# Patient Record
Sex: FEMALE | Race: WHITE | Employment: FULL TIME | URBAN - NONMETROPOLITAN AREA
[De-identification: names, ages, dates, MRNs, and addresses within clinical notes are randomized per-mention and may not be internally consistent; named-entity substitution may affect disease eponyms.]

---

## 2017-07-26 LAB
ALBUMIN SERPL-MCNC: 3.8 G/DL (ref 3.5–5.2)
ALP BLD-CCNC: 99 U/L (ref 35–104)
ALT SERPL-CCNC: 18 U/L (ref 5–33)
ANION GAP SERPL CALCULATED.3IONS-SCNC: 16 MMOL/L (ref 7–19)
AST SERPL-CCNC: 17 U/L (ref 5–32)
BASOPHILS ABSOLUTE: 0 K/UL (ref 0–0.2)
BASOPHILS RELATIVE PERCENT: 0.6 % (ref 0–1)
BILIRUB SERPL-MCNC: <0.2 MG/DL (ref 0.2–1.2)
BUN BLDV-MCNC: 13 MG/DL (ref 6–20)
CALCIUM SERPL-MCNC: 9.5 MG/DL (ref 8.6–10)
CHLORIDE BLD-SCNC: 103 MMOL/L (ref 98–111)
CHOLESTEROL, TOTAL: 165 MG/DL (ref 160–199)
CO2: 22 MMOL/L (ref 22–29)
CREAT SERPL-MCNC: 0.7 MG/DL (ref 0.5–0.9)
EOSINOPHILS ABSOLUTE: 0.1 K/UL (ref 0–0.6)
EOSINOPHILS RELATIVE PERCENT: 1.8 % (ref 0–5)
GFR NON-AFRICAN AMERICAN: >60
GLUCOSE BLD-MCNC: 102 MG/DL (ref 74–109)
HCT VFR BLD CALC: 41.9 % (ref 37–47)
HDLC SERPL-MCNC: 31 MG/DL (ref 65–121)
HEMOGLOBIN: 13.2 G/DL (ref 12–16)
LDL CHOLESTEROL CALCULATED: 93 MG/DL
LYMPHOCYTES ABSOLUTE: 1.5 K/UL (ref 1.1–4.5)
LYMPHOCYTES RELATIVE PERCENT: 22.2 % (ref 20–40)
MCH RBC QN AUTO: 28.6 PG (ref 27–31)
MCHC RBC AUTO-ENTMCNC: 31.5 G/DL (ref 33–37)
MCV RBC AUTO: 90.7 FL (ref 81–99)
MONOCYTES ABSOLUTE: 0.5 K/UL (ref 0–0.9)
MONOCYTES RELATIVE PERCENT: 7.9 % (ref 0–10)
NEUTROPHILS ABSOLUTE: 4.6 K/UL (ref 1.5–7.5)
NEUTROPHILS RELATIVE PERCENT: 66.3 % (ref 50–65)
PDW BLD-RTO: 13.4 % (ref 11.5–14.5)
PLATELET # BLD: 298 K/UL (ref 130–400)
PMV BLD AUTO: 11.3 FL (ref 9.4–12.3)
POTASSIUM SERPL-SCNC: 4.4 MMOL/L (ref 3.5–5)
RBC # BLD: 4.62 M/UL (ref 4.2–5.4)
SODIUM BLD-SCNC: 141 MMOL/L (ref 136–145)
TOTAL PROTEIN: 7.1 G/DL (ref 6.6–8.7)
TRIGL SERPL-MCNC: 204 MG/DL (ref 150–199)
WBC # BLD: 6.9 K/UL (ref 4.8–10.8)

## 2017-08-01 ENCOUNTER — OFFICE VISIT (OUTPATIENT)
Dept: INTERNAL MEDICINE | Age: 42
End: 2017-08-01
Payer: COMMERCIAL

## 2017-08-01 VITALS
DIASTOLIC BLOOD PRESSURE: 80 MMHG | HEIGHT: 66 IN | HEART RATE: 88 BPM | BODY MASS INDEX: 42.59 KG/M2 | WEIGHT: 265 LBS | SYSTOLIC BLOOD PRESSURE: 138 MMHG | OXYGEN SATURATION: 99 %

## 2017-08-01 DIAGNOSIS — M26.609 JOINT DISEASE, TEMPOROMANDIBULAR: ICD-10-CM

## 2017-08-01 DIAGNOSIS — J32.4 CHRONIC PANSINUSITIS: ICD-10-CM

## 2017-08-01 DIAGNOSIS — E78.00 HYPERCHOLESTEREMIA: Primary | ICD-10-CM

## 2017-08-01 DIAGNOSIS — B02.9 HERPES ZOSTER WITHOUT COMPLICATION: ICD-10-CM

## 2017-08-01 DIAGNOSIS — J41.0 SIMPLE CHRONIC BRONCHITIS (HCC): ICD-10-CM

## 2017-08-01 DIAGNOSIS — Z23 NEED FOR PROPHYLACTIC VACCINATION AGAINST DIPHTHERIA-TETANUS-PERTUSSIS (DTP): ICD-10-CM

## 2017-08-01 DIAGNOSIS — Z13.1 ENCOUNTER FOR SCREENING FOR DIABETES MELLITUS: ICD-10-CM

## 2017-08-01 PROCEDURE — 99215 OFFICE O/P EST HI 40 MIN: CPT | Performed by: INTERNAL MEDICINE

## 2017-08-01 PROCEDURE — 90715 TDAP VACCINE 7 YRS/> IM: CPT | Performed by: INTERNAL MEDICINE

## 2017-08-01 PROCEDURE — 90471 IMMUNIZATION ADMIN: CPT | Performed by: INTERNAL MEDICINE

## 2017-08-01 RX ORDER — HYDROCODONE BITARTRATE AND ACETAMINOPHEN 7.5; 325 MG/1; MG/1
1 TABLET ORAL
COMMUNITY
Start: 2017-03-27 | End: 2017-11-17 | Stop reason: ALTCHOICE

## 2017-08-01 RX ORDER — CETIRIZINE HYDROCHLORIDE 10 MG/1
10 TABLET ORAL
COMMUNITY
End: 2017-08-01 | Stop reason: SDUPTHER

## 2017-08-01 RX ORDER — MELOXICAM 7.5 MG/1
7.5 TABLET ORAL 2 TIMES DAILY
Qty: 60 TABLET | Refills: 5 | Status: SHIPPED | OUTPATIENT
Start: 2017-08-01 | End: 2017-11-17 | Stop reason: ALTCHOICE

## 2017-08-01 ASSESSMENT — ENCOUNTER SYMPTOMS
ABDOMINAL PAIN: 0
EYE DISCHARGE: 0
EYE ITCHING: 0
SORE THROAT: 0
WHEEZING: 0
VOMITING: 0
BLOOD IN STOOL: 0
NAUSEA: 0
TROUBLE SWALLOWING: 0
SHORTNESS OF BREATH: 0
BACK PAIN: 0
ABDOMINAL DISTENTION: 0

## 2017-08-01 ASSESSMENT — PATIENT HEALTH QUESTIONNAIRE - PHQ9
SUM OF ALL RESPONSES TO PHQ QUESTIONS 1-9: 0
2. FEELING DOWN, DEPRESSED OR HOPELESS: 0
SUM OF ALL RESPONSES TO PHQ9 QUESTIONS 1 & 2: 0

## 2017-09-23 ENCOUNTER — OFFICE VISIT (OUTPATIENT)
Dept: URGENT CARE | Age: 42
End: 2017-09-23
Payer: COMMERCIAL

## 2017-09-23 VITALS
BODY MASS INDEX: 42.27 KG/M2 | WEIGHT: 263 LBS | TEMPERATURE: 97.6 F | DIASTOLIC BLOOD PRESSURE: 102 MMHG | HEART RATE: 81 BPM | RESPIRATION RATE: 20 BRPM | SYSTOLIC BLOOD PRESSURE: 170 MMHG | HEIGHT: 66 IN | OXYGEN SATURATION: 97 %

## 2017-09-23 DIAGNOSIS — H65.01 RIGHT ACUTE SEROUS OTITIS MEDIA, RECURRENCE NOT SPECIFIED: Primary | ICD-10-CM

## 2017-09-23 DIAGNOSIS — J02.9 SORE THROAT: ICD-10-CM

## 2017-09-23 LAB — S PYO AG THROAT QL: NORMAL

## 2017-09-23 PROCEDURE — 96372 THER/PROPH/DIAG INJ SC/IM: CPT | Performed by: FAMILY MEDICINE

## 2017-09-23 PROCEDURE — 99213 OFFICE O/P EST LOW 20 MIN: CPT | Performed by: FAMILY MEDICINE

## 2017-09-23 PROCEDURE — 87880 STREP A ASSAY W/OPTIC: CPT | Performed by: FAMILY MEDICINE

## 2017-09-23 RX ORDER — DEXAMETHASONE SODIUM PHOSPHATE 10 MG/ML
10 INJECTION INTRAMUSCULAR; INTRAVENOUS ONCE
Status: COMPLETED | OUTPATIENT
Start: 2017-09-23 | End: 2017-09-23

## 2017-09-23 RX ORDER — AZITHROMYCIN 250 MG/1
TABLET, FILM COATED ORAL
Qty: 1 PACKET | Refills: 0 | Status: SHIPPED | OUTPATIENT
Start: 2017-09-23 | End: 2017-10-03

## 2017-09-23 RX ORDER — EPINEPHRINE 0.3 MG/.3ML
INJECTION INTRAMUSCULAR
COMMUNITY
Start: 2017-08-29 | End: 2021-03-08 | Stop reason: SDUPTHER

## 2017-09-23 RX ORDER — AMOXICILLIN AND CLAVULANATE POTASSIUM 875; 125 MG/1; MG/1
TABLET, FILM COATED ORAL
COMMUNITY
Start: 2017-08-29 | End: 2017-11-17 | Stop reason: ALTCHOICE

## 2017-09-23 RX ADMIN — DEXAMETHASONE SODIUM PHOSPHATE 10 MG: 10 INJECTION INTRAMUSCULAR; INTRAVENOUS at 16:48

## 2017-09-24 ASSESSMENT — ENCOUNTER SYMPTOMS: COUGH: 1

## 2017-11-17 ENCOUNTER — OFFICE VISIT (OUTPATIENT)
Dept: INTERNAL MEDICINE | Age: 42
End: 2017-11-17
Payer: COMMERCIAL

## 2017-11-17 VITALS
OXYGEN SATURATION: 96 % | SYSTOLIC BLOOD PRESSURE: 158 MMHG | DIASTOLIC BLOOD PRESSURE: 104 MMHG | HEIGHT: 67 IN | BODY MASS INDEX: 40.81 KG/M2 | HEART RATE: 86 BPM | WEIGHT: 260 LBS

## 2017-11-17 DIAGNOSIS — I10 ESSENTIAL HYPERTENSION: ICD-10-CM

## 2017-11-17 PROCEDURE — 99213 OFFICE O/P EST LOW 20 MIN: CPT | Performed by: INTERNAL MEDICINE

## 2017-11-17 RX ORDER — PREDNISONE 20 MG/1
TABLET ORAL
COMMUNITY
Start: 2017-11-16 | End: 2018-03-13

## 2017-11-17 RX ORDER — LEVOCETIRIZINE DIHYDROCHLORIDE 5 MG/1
5 TABLET, FILM COATED ORAL NIGHTLY
COMMUNITY
Start: 2017-11-16

## 2017-11-17 RX ORDER — FLUTICASONE PROPIONATE 50 MCG
1 SPRAY, SUSPENSION (ML) NASAL PRN
COMMUNITY
Start: 2017-11-16

## 2017-11-17 RX ORDER — QUINAPRIL 10 MG/1
10 TABLET ORAL DAILY
Qty: 90 TABLET | Refills: 3 | Status: SHIPPED | OUTPATIENT
Start: 2017-11-17 | End: 2018-03-17 | Stop reason: SDUPTHER

## 2017-11-17 RX ORDER — MELOXICAM 15 MG/1
TABLET ORAL
COMMUNITY
Start: 2017-11-09 | End: 2018-03-13

## 2017-11-17 ASSESSMENT — ENCOUNTER SYMPTOMS
BLOOD IN STOOL: 0
EYE ITCHING: 0
BACK PAIN: 0
ABDOMINAL DISTENTION: 0
VOMITING: 0
WHEEZING: 0
EYE DISCHARGE: 0
SINUS PRESSURE: 0
NAUSEA: 0
ABDOMINAL PAIN: 0
SHORTNESS OF BREATH: 0
TROUBLE SWALLOWING: 0
SORE THROAT: 0

## 2017-11-17 NOTE — PROGRESS NOTES
[facility] INTERNAL MEDICINE  [address]  [address] [place] [address]  Dept: [phone]  Dept Fax: [phone]: [phone]      Visit Date: 11/17/2017    Adrienne Schmid is a 38 y.o. female who presents today for:  Chief Complaint   Patient presents with    Hypertension     up 3 times at dr Deepika Marques office thinks she was on norvasc 10 in the past         HPI:     Tere's in for problem visit. She is 38years old and for the last 6 weeks or so her blood pressures been elevated. She's been going to see Dr. Iveth Kelly on the last 3 visits there it's been about 180/100. L1 it's because of her headache and she wants to get it down. Pressure today is 158/100. Social History   Substance Use Topics    Smoking status: Never Smoker    Smokeless tobacco: Never Used    Alcohol use No      Current Outpatient Prescriptions   Medication Sig Dispense Refill    fluticasone (FLONASE) 50 MCG/ACT nasal spray       levocetirizine (XYZAL) 5 MG tablet       meloxicam (MOBIC) 15 MG tablet       predniSONE (DELTASONE) 20 MG tablet       EPIPEN 2-SERGIO 0.3 MG/0.3ML SOAJ injection       Omeprazole Magnesium 10 MG PACK Take 20 mg by mouth      PROAIR  (90 Base) MCG/ACT inhaler        No current facility-administered medications for this visit. No Known Allergies      Subjective:      Review of Systems   Constitutional: Negative for activity change, appetite change, fatigue and fever. HENT: Negative for congestion, hearing loss, sinus pressure, sore throat and trouble swallowing. Eyes: Negative for discharge and itching. Respiratory: Negative for shortness of breath and wheezing. Cardiovascular: Negative for chest pain, palpitations and leg swelling. Gastrointestinal: Negative for abdominal distention, abdominal pain, blood in stool, nausea and vomiting. Endocrine: Negative for cold intolerance, heat intolerance and polydipsia.    Genitourinary: Negative She exhibits normal muscle tone. Coordination normal.   Skin: Skin is warm and dry. No rash noted. She is not diaphoretic. No erythema. No pallor. Psychiatric: She has a normal mood and affect. Her behavior is normal. Judgment and thought content normal.        Assessment:     1. Essential hypertension              Plan:      Essential hypertension which is poorly controlled. I'm going to have her increase her exercise and put her on 2 g sodium diet. She has been on blood pressure medicine back in 2008 and she thinks it was amlodipine 10 mg a day. We are going to check with pharmacy but we'll go ahead and send amlodipine 10 mg into them and I will see her back in 2 weeks for recheck on her blood pressure. Return in about 2 weeks (around 12/1/2017) for blood pressure check. Patient given educational materials - see patient instructions. Discussed use, benefit, and side effects of prescribed medications. All patient questions answered. Pt voiced understanding. Reviewed health maintenance. Instructed to continue current medications, diet and exercise. Patient agreed with treatment plan. **This report has been created using voice recognition software. It may contain minor errors which are inherent in voice recognition technology. **    Electronically signed by Francisco Kaye MD on 11/17/2017 at 9:27 AM

## 2017-11-27 ENCOUNTER — HOSPITAL ENCOUNTER (OUTPATIENT)
Dept: NON INVASIVE DIAGNOSTICS | Age: 42
Discharge: HOME | End: 2017-11-27
Payer: COMMERCIAL

## 2017-11-27 ENCOUNTER — OFFICE VISIT (OUTPATIENT)
Dept: INTERNAL MEDICINE | Age: 42
End: 2017-11-27
Payer: COMMERCIAL

## 2017-11-27 VITALS — DIASTOLIC BLOOD PRESSURE: 80 MMHG | SYSTOLIC BLOOD PRESSURE: 130 MMHG | HEART RATE: 64 BPM | OXYGEN SATURATION: 98 %

## 2017-11-27 DIAGNOSIS — M79.661 PAIN OF RIGHT CALF: ICD-10-CM

## 2017-11-27 DIAGNOSIS — I10 ESSENTIAL HYPERTENSION: Primary | ICD-10-CM

## 2017-11-27 PROCEDURE — 93971 EXTREMITY STUDY: CPT

## 2017-11-27 PROCEDURE — 99214 OFFICE O/P EST MOD 30 MIN: CPT | Performed by: INTERNAL MEDICINE

## 2017-11-27 ASSESSMENT — ENCOUNTER SYMPTOMS
EYE DISCHARGE: 0
ABDOMINAL DISTENTION: 0
SORE THROAT: 0
BLOOD IN STOOL: 0
BACK PAIN: 0
SINUS PRESSURE: 0
WHEEZING: 0
TROUBLE SWALLOWING: 0
SHORTNESS OF BREATH: 0
ABDOMINAL PAIN: 0
NAUSEA: 0
EYE ITCHING: 0
VOMITING: 0

## 2017-11-27 NOTE — PROGRESS NOTES
Neurological: She is alert and oriented to person, place, and time. She has normal reflexes. No cranial nerve deficit. She exhibits normal muscle tone. Coordination normal.   Skin: Skin is warm and dry. No rash noted. She is not diaphoretic. No erythema. No pallor. Psychiatric: She has a normal mood and affect. Her behavior is normal. Judgment and thought content normal.        Assessment:     1. Essential hypertension     2. Pain of right calf  [address] - DE Duplex Extrem Venous, Bilat            Plan:     Lamont Hester present for follow-up on her blood pressure. We saw HER-2 weeks ago and she had an elevated pressure. We started her on medication and brought her back today for follow-up. She reports that her blood pressures been doing much better. Is on Accupril 10 mg a day and she's tolerating it without any side effects. Her blood pressure today is 130/80 on any continue the same. She has a new pain in her Loc right calf is been going on for the last 5 days. It's worse when she stands or tries to walk on it. She denies any trauma. There is no bruising or erythema consistent with any kind of a bleed or infection. I'm going to get a noninvasive exercise or lower extremity to rule out clot. She is to continue the blood pressure medicine as ordered. We will call results of her scan when we get it back. Otherwise we'll see her back at her scheduled appointment. Return for Keep scheduled appointment. .     Patient given educational materials - see patient instructions. Discussed use, benefit, and side effects of prescribed medications. All patient questions answered. Pt voiced understanding. Reviewed health maintenance. Instructed to continue current medications, diet and exercise. Patient agreed with treatment plan. **This report has been created using voice recognition software. It may contain minor errors which are inherent in voice recognition technology. **    Electronically signed by Jhonatan Kim

## 2017-11-28 ENCOUNTER — TELEPHONE (OUTPATIENT)
Dept: INTERNAL MEDICINE | Age: 42
End: 2017-11-28

## 2017-11-28 NOTE — TELEPHONE ENCOUNTER
[facility] and [facility] evaluate her leg.  She is already taking NSAIDs and is on prednisone and I would not add any other thing until she is seen and evaluated by them

## 2018-02-13 DIAGNOSIS — M26.609 JOINT DISEASE, TEMPOROMANDIBULAR: ICD-10-CM

## 2018-02-13 RX ORDER — MELOXICAM 15 MG/1
TABLET ORAL
Qty: 30 TABLET | Refills: 5 | Status: SHIPPED | OUTPATIENT
Start: 2018-02-13 | End: 2018-08-14 | Stop reason: SDUPTHER

## 2018-03-13 ENCOUNTER — TELEPHONE (OUTPATIENT)
Dept: INTERNAL MEDICINE | Age: 43
End: 2018-03-13

## 2018-03-13 ENCOUNTER — OFFICE VISIT (OUTPATIENT)
Dept: INTERNAL MEDICINE | Age: 43
End: 2018-03-13
Payer: COMMERCIAL

## 2018-03-13 VITALS
TEMPERATURE: 98.5 F | SYSTOLIC BLOOD PRESSURE: 122 MMHG | OXYGEN SATURATION: 98 % | DIASTOLIC BLOOD PRESSURE: 80 MMHG | HEART RATE: 86 BPM

## 2018-03-13 DIAGNOSIS — J32.4 PANSINUSITIS, UNSPECIFIED CHRONICITY: Primary | ICD-10-CM

## 2018-03-13 DIAGNOSIS — M79.671 FOOT PAIN, RIGHT: ICD-10-CM

## 2018-03-13 PROCEDURE — 99213 OFFICE O/P EST LOW 20 MIN: CPT | Performed by: INTERNAL MEDICINE

## 2018-03-13 RX ORDER — CEFDINIR 300 MG/1
300 CAPSULE ORAL 2 TIMES DAILY
Qty: 14 CAPSULE | Refills: 0 | Status: SHIPPED | OUTPATIENT
Start: 2018-03-13 | End: 2018-03-20

## 2018-03-13 ASSESSMENT — ENCOUNTER SYMPTOMS
SORE THROAT: 0
ABDOMINAL PAIN: 0
ABDOMINAL DISTENTION: 0
BLOOD IN STOOL: 0
SHORTNESS OF BREATH: 0
TROUBLE SWALLOWING: 0
EYE DISCHARGE: 0
SINUS PRESSURE: 1
VOMITING: 0
SINUS PAIN: 1
WHEEZING: 0
EYE ITCHING: 0
BACK PAIN: 0
VOICE CHANGE: 1
NAUSEA: 0

## 2018-03-13 NOTE — PROGRESS NOTES
[facility] INTERNAL MEDICINE  [address]  [address] [place] [address]  Dept: [phone]  Dept Fax: [phone]: [phone]      Visit Date: 3/13/2018    Bereket Pelletier is a 38 y.o. female who presents today for:  Chief Complaint   Patient presents with    Other     poss bite on top of right foot,hoarse and sob,sore throat,hear ache and headache for 3 days         HPI:     And her present with a sinus infection with head congestion sore throat and drainage down the back of her throat and she is coughing up nasty stuff today. She's had no fever she's also complaining of some foot pain in the top of her left right foot which is caused some swelling looks as though she may have some gout. Social History   Substance Use Topics    Smoking status: Never Smoker    Smokeless tobacco: Never Used    Alcohol use No      Current Outpatient Prescriptions   Medication Sig Dispense Refill    cefdinir (OMNICEF) 300 MG capsule Take 1 capsule by mouth 2 times daily for 7 days 14 capsule 0    meloxicam (MOBIC) 15 MG tablet TAKE 1/2 TABLET BY MOUTH 2 TIMES A DAY 30 tablet 5    fluticasone (FLONASE) 50 MCG/ACT nasal spray       levocetirizine (XYZAL) 5 MG tablet       quinapril (ACCUPRIL) 10 MG tablet Take 1 tablet by mouth daily 90 tablet 3    EPIPEN 2-SERGIO 0.3 MG/0.3ML SOAJ injection       Omeprazole Magnesium 10 MG PACK Take 20 mg by mouth      PROAIR  (90 Base) MCG/ACT inhaler        No current facility-administered medications for this visit. No Known Allergies      Subjective:      Review of Systems   Constitutional: Negative for activity change, appetite change, fatigue and fever. HENT: Positive for hearing loss, postnasal drip, sinus pain, sinus pressure and voice change. Negative for congestion, sore throat and trouble swallowing. Eyes: Negative for discharge and itching. Respiratory: Negative for shortness of breath and wheezing. exhibits no edema, tenderness or deformity. Lymphadenopathy:     She has no cervical adenopathy. Neurological: She is alert and oriented to person, place, and time. She has normal reflexes. No cranial nerve deficit. She exhibits normal muscle tone. Coordination normal.   Skin: Skin is warm and dry. No rash noted. She is not diaphoretic. No erythema. No pallor. Psychiatric: She has a normal mood and affect. Her behavior is normal. Judgment and thought content normal.        Assessment:     1. Pansinusitis, unspecified chronicity     2. Foot pain, right  Uric Acid            Plan:     Sinusitis which is recurrent. I'm going to put her on some  Omnicef 300 mg twice a day for 10 days and she is already on Claritin and Flonase and she's been told to keep herself on that and push fluids. Foot pain looks as though she may have gout send her up to the lab for uric acid level and we will call her the results. Otherwise she is to keep her scheduled appointment. Return for Keep scheduled appointment. .     Patient given educational materials - see patient instructions. Discussed use, benefit, and side effects of prescribed medications. All patient questions answered. Pt voiced understanding. Reviewed health maintenance. Instructed to continue current medications, diet and exercise. Patient agreed with treatment plan. **This report has been created using voice recognition software. It may contain minor errors which are inherent in voice recognition technology. **    Electronically signed by Noemi Wei MD on 3/13/2018 at 11:00 AM

## 2018-03-15 ENCOUNTER — TELEPHONE (OUTPATIENT)
Dept: INTERNAL MEDICINE | Age: 43
End: 2018-03-15

## 2018-03-15 RX ORDER — TOBRAMYCIN 3 MG/ML
1 SOLUTION/ DROPS OPHTHALMIC EVERY 6 HOURS
Qty: 1 BOTTLE | Refills: 1 | Status: SHIPPED | OUTPATIENT
Start: 2018-03-15 | End: 2018-03-25

## 2018-03-19 RX ORDER — QUINAPRIL 10 MG/1
10 TABLET ORAL DAILY
Qty: 30 TABLET | Refills: 0 | Status: SHIPPED | OUTPATIENT
Start: 2018-03-19 | End: 2018-07-16 | Stop reason: CLARIF

## 2018-03-19 NOTE — TELEPHONE ENCOUNTER
Ehsan Robbins called requesting a refill of the below medication which has been pended for you:     Requested Prescriptions     Pending Prescriptions Disp Refills    quinapril (ACCUPRIL) 10 MG tablet [Pharmacy Med Name: QUINAPRIL 10 MG TABLET] 30 tablet 0     Sig: Take 1 tablet by mouth daily       Last Appointment Date: 3/13/2018  Next Appointment Date: 8/1/2018    No Known Allergies

## 2018-06-15 RX ORDER — QUINAPRIL 10 MG/1
10 TABLET ORAL DAILY
Qty: 90 TABLET | Refills: 1 | Status: SHIPPED | OUTPATIENT
Start: 2018-06-15 | End: 2018-07-16 | Stop reason: SDUPTHER

## 2018-07-16 RX ORDER — QUINAPRIL 10 MG/1
10 TABLET ORAL DAILY
Qty: 90 TABLET | Refills: 1 | Status: SHIPPED | OUTPATIENT
Start: 2018-07-16 | End: 2018-08-20 | Stop reason: SDUPTHER

## 2018-08-14 DIAGNOSIS — M26.609 JOINT DISEASE, TEMPOROMANDIBULAR: ICD-10-CM

## 2018-08-14 RX ORDER — MELOXICAM 15 MG/1
TABLET ORAL
Qty: 30 TABLET | Refills: 1 | Status: SHIPPED | OUTPATIENT
Start: 2018-08-14 | End: 2018-10-12 | Stop reason: SDUPTHER

## 2018-08-20 ENCOUNTER — OFFICE VISIT (OUTPATIENT)
Dept: INTERNAL MEDICINE | Age: 43
End: 2018-08-20
Payer: COMMERCIAL

## 2018-08-20 VITALS
SYSTOLIC BLOOD PRESSURE: 142 MMHG | DIASTOLIC BLOOD PRESSURE: 98 MMHG | BODY MASS INDEX: 44.2 KG/M2 | HEART RATE: 84 BPM | WEIGHT: 275 LBS | OXYGEN SATURATION: 98 % | HEIGHT: 66 IN

## 2018-08-20 DIAGNOSIS — B37.89 CANDIDIASIS OF BREAST: ICD-10-CM

## 2018-08-20 DIAGNOSIS — M26.609 JOINT DISEASE, TEMPOROMANDIBULAR: ICD-10-CM

## 2018-08-20 DIAGNOSIS — I10 ESSENTIAL HYPERTENSION: Primary | ICD-10-CM

## 2018-08-20 DIAGNOSIS — E66.9 OBESITY (BMI 35.0-39.9 WITHOUT COMORBIDITY): ICD-10-CM

## 2018-08-20 PROCEDURE — 99215 OFFICE O/P EST HI 40 MIN: CPT | Performed by: INTERNAL MEDICINE

## 2018-08-20 RX ORDER — OMEPRAZOLE 20 MG/1
20 CAPSULE, DELAYED RELEASE ORAL DAILY
COMMUNITY

## 2018-08-20 RX ORDER — CLOTRIMAZOLE AND BETAMETHASONE DIPROPIONATE 10; .64 MG/G; MG/G
CREAM TOPICAL
Qty: 1 TUBE | Refills: 2 | Status: SHIPPED
Start: 2018-08-20 | End: 2020-03-03 | Stop reason: CLARIF

## 2018-08-20 RX ORDER — QUINAPRIL 20 MG/1
20 TABLET ORAL DAILY
Qty: 30 TABLET | Refills: 5 | Status: SHIPPED | OUTPATIENT
Start: 2018-08-20 | End: 2018-09-13 | Stop reason: SDUPTHER

## 2018-08-20 ASSESSMENT — PATIENT HEALTH QUESTIONNAIRE - PHQ9
2. FEELING DOWN, DEPRESSED OR HOPELESS: 0
SUM OF ALL RESPONSES TO PHQ QUESTIONS 1-9: 0
1. LITTLE INTEREST OR PLEASURE IN DOING THINGS: 0
SUM OF ALL RESPONSES TO PHQ9 QUESTIONS 1 & 2: 0
SUM OF ALL RESPONSES TO PHQ QUESTIONS 1-9: 0

## 2018-08-20 ASSESSMENT — ENCOUNTER SYMPTOMS
ABDOMINAL PAIN: 0
TROUBLE SWALLOWING: 0
ROS SKIN COMMENTS: UNDER RIGHT BREAST
EYE ITCHING: 0
EYE DISCHARGE: 0
NAUSEA: 0
WHEEZING: 0
SINUS PRESSURE: 0
BACK PAIN: 0
VOMITING: 0
SORE THROAT: 0
BLOOD IN STOOL: 0
SHORTNESS OF BREATH: 0
ABDOMINAL DISTENTION: 0

## 2018-08-20 NOTE — PROGRESS NOTES
[facility]  [address] [facility]  [address] [place] [id number]  Dept: [phone]  Dept Fax: [phone]: [phone]      Visit Date: 8/20/2018    Mita Grant is a 37 y.o. female who presents today for:  Chief Complaint   Patient presents with    Abdominal Pain     Dr Rolanda Cabrera  gyn    Other     rash under breast         HPI:     Pawel Prakash is 37years old and in for her annual exam. She has problems with obesity and hypertension and her blood pressures not his good visit spent. Her weights up and she needs to get on a late weight loss diet and she's here today talking about that and wants to know what she can do to lose weight. She seems to be highly motivated something she could be successful. She's got a rash under her right breast is driving her crazy. She has TMJ syndrome that she uses meloxicam for her good results. She otherwise has no new complaints other than she wants to lose weight    Past Medical History:   Diagnosis Date    Allergic rhinitis     Asthma     Chronic pansinusitis 8/1/2017    Essential hypertension 11/17/2017    Herpes zoster without complication 2/8/7125    Simple chronic bronchitis ([address] 8/1/2017      Past Surgical History:   Procedure Laterality Date    HYSTERECTOMY      TONSILLECTOMY         No family history on file. Social History   Substance Use Topics    Smoking status: Never Smoker    Smokeless tobacco: Never Used    Alcohol use No      Current Outpatient Prescriptions   Medication Sig Dispense Refill    omeprazole (PRILOSEC) 20 MG delayed release capsule Take 20 mg by mouth daily      quinapril (ACCUPRIL) 20 MG tablet Take 1 tablet by mouth daily 30 tablet 5    clotrimazole-betamethasone (LOTRISONE) 1-0.05 % cream Apply topically 2 times daily.  1 Tube 2    meloxicam (MOBIC) 15 MG tablet TAKE 1/2 TABLET BY MOUTH 2 TIMES A DAY 30 tablet 1    fluticasone (FLONASE) 50 MCG/ACT nasal spray       4:08 PM

## 2018-09-13 ENCOUNTER — TELEPHONE (OUTPATIENT)
Dept: INTERNAL MEDICINE | Age: 43
End: 2018-09-13

## 2018-09-13 RX ORDER — QUINAPRIL 40 MG/1
40 TABLET ORAL DAILY
Qty: 30 TABLET | Refills: 2 | Status: SHIPPED | OUTPATIENT
Start: 2018-09-13 | End: 2018-12-10 | Stop reason: SDUPTHER

## 2018-09-13 NOTE — TELEPHONE ENCOUNTER
Calling back with B/P reading 9/8//92, 9/10//94, 9/12//96, you increased Quinapril to 20mg on 8/20, does she need to increase again or add something to it?

## 2018-10-12 DIAGNOSIS — M26.609 JOINT DISEASE, TEMPOROMANDIBULAR: ICD-10-CM

## 2018-10-12 RX ORDER — MELOXICAM 15 MG/1
TABLET ORAL
Qty: 30 TABLET | Refills: 2 | Status: SHIPPED | OUTPATIENT
Start: 2018-10-12 | End: 2019-01-10 | Stop reason: SDUPTHER

## 2018-10-16 ENCOUNTER — OFFICE VISIT (OUTPATIENT)
Dept: INTERNAL MEDICINE | Age: 43
End: 2018-10-16
Payer: COMMERCIAL

## 2018-10-16 VITALS
SYSTOLIC BLOOD PRESSURE: 130 MMHG | DIASTOLIC BLOOD PRESSURE: 80 MMHG | WEIGHT: 270 LBS | HEART RATE: 80 BPM | OXYGEN SATURATION: 98 % | BODY MASS INDEX: 43.39 KG/M2 | HEIGHT: 66 IN

## 2018-10-16 DIAGNOSIS — M19.90 OSTEOARTHRITIS, UNSPECIFIED OSTEOARTHRITIS TYPE, UNSPECIFIED SITE: ICD-10-CM

## 2018-10-16 DIAGNOSIS — K21.9 GASTROESOPHAGEAL REFLUX DISEASE WITHOUT ESOPHAGITIS: ICD-10-CM

## 2018-10-16 DIAGNOSIS — Z23 FLU VACCINE NEED: ICD-10-CM

## 2018-10-16 DIAGNOSIS — J32.4 CHRONIC PANSINUSITIS: ICD-10-CM

## 2018-10-16 DIAGNOSIS — I10 ESSENTIAL HYPERTENSION: Primary | ICD-10-CM

## 2018-10-16 PROCEDURE — 99214 OFFICE O/P EST MOD 30 MIN: CPT | Performed by: INTERNAL MEDICINE

## 2018-10-16 PROCEDURE — 90686 IIV4 VACC NO PRSV 0.5 ML IM: CPT | Performed by: INTERNAL MEDICINE

## 2018-10-16 PROCEDURE — 90471 IMMUNIZATION ADMIN: CPT | Performed by: INTERNAL MEDICINE

## 2018-10-16 ASSESSMENT — ENCOUNTER SYMPTOMS
SINUS PRESSURE: 0
TROUBLE SWALLOWING: 0
WHEEZING: 0
BLOOD IN STOOL: 0
VOMITING: 0
ABDOMINAL PAIN: 0
BACK PAIN: 0
ABDOMINAL DISTENTION: 0
NAUSEA: 0
EYE DISCHARGE: 0
SHORTNESS OF BREATH: 0
SORE THROAT: 0
EYE ITCHING: 0

## 2018-10-16 NOTE — ADDENDUM NOTE
[facility] on: 10/16/2018 03:06 PM     Modules accepted: Orders

## 2018-10-16 NOTE — PROGRESS NOTES
Systems   Constitutional: Negative for activity change, appetite change, fatigue and fever. HENT: Negative for congestion, hearing loss, sinus pressure, sore throat and trouble swallowing. Eyes: Negative for discharge and itching. Respiratory: Negative for shortness of breath and wheezing. Cardiovascular: Negative for chest pain, palpitations and leg swelling. Gastrointestinal: Negative for abdominal distention, abdominal pain, blood in stool, nausea and vomiting. Endocrine: Negative for cold intolerance, heat intolerance and polydipsia. Genitourinary: Negative for flank pain, frequency, hematuria and urgency. Musculoskeletal: Negative for arthralgias, back pain and joint swelling. Skin: Negative for rash and wound. Allergic/Immunologic: Negative for environmental allergies and food allergies. Neurological: Negative for dizziness, tremors, syncope, weakness, numbness and headaches. Hematological: Negative for adenopathy. Psychiatric/Behavioral: Negative for agitation and hallucinations. The patient is not nervous/anxious. Objective:     /80   Pulse 80   Ht 5' 6\" (1.676 m)   Wt 270 lb (122.5 kg)   SpO2 98%   BMI 43.58 kg/m²    Physical Exam   Constitutional: She is oriented to person, place, and time. She appears well-developed and well-nourished. No distress. HENT:   Head: Normocephalic and atraumatic. Right Ear: External ear normal.   Left Ear: External ear normal.   Nose: Nose normal.   Mouth/Throat: Oropharynx is clear and moist. No oropharyngeal exudate. Eyes: Pupils are equal, round, and reactive to light. Conjunctivae and EOM are normal. Right eye exhibits no discharge. Left eye exhibits no discharge. No scleral icterus. Neck: Normal range of motion. Neck supple. No JVD present. No tracheal deviation present. No thyromegaly present. Cardiovascular: Normal rate, regular rhythm, normal heart sounds and intact distal pulses.   Exam reveals no gallop and no

## 2018-12-10 RX ORDER — QUINAPRIL 40 MG/1
TABLET ORAL
Qty: 30 TABLET | Refills: 0 | Status: SHIPPED | OUTPATIENT
Start: 2018-12-10 | End: 2019-01-10 | Stop reason: SDUPTHER

## 2018-12-10 NOTE — TELEPHONE ENCOUNTER
Zak Blair called requesting a refill of the below medication which has been pended for you:     Requested Prescriptions     Pending Prescriptions Disp Refills    quinapril (ACCUPRIL) 40 MG tablet [Pharmacy Med Name: QUINAPRIL 40 MG TABLET] 30 tablet 0     Sig: TAKE ONE TABLET BY MOUTH EVERY DAY       Last Appointment Date: 10/16/2018  Next Appointment Date: Visit date not found    No Known Allergies

## 2019-01-10 DIAGNOSIS — M26.609 JOINT DISEASE, TEMPOROMANDIBULAR: ICD-10-CM

## 2019-01-10 RX ORDER — MELOXICAM 15 MG/1
TABLET ORAL
Qty: 30 TABLET | Refills: 0 | Status: SHIPPED | OUTPATIENT
Start: 2019-01-10 | End: 2019-03-13 | Stop reason: SDUPTHER

## 2019-01-10 RX ORDER — QUINAPRIL 40 MG/1
TABLET ORAL
Qty: 30 TABLET | Refills: 0 | Status: SHIPPED | OUTPATIENT
Start: 2019-01-10 | End: 2019-03-13 | Stop reason: SDUPTHER

## 2019-03-02 ENCOUNTER — OFFICE VISIT (OUTPATIENT)
Dept: URGENT CARE | Age: 44
End: 2019-03-02
Payer: COMMERCIAL

## 2019-03-02 VITALS
TEMPERATURE: 98 F | HEART RATE: 101 BPM | SYSTOLIC BLOOD PRESSURE: 168 MMHG | BODY MASS INDEX: 44.55 KG/M2 | WEIGHT: 276 LBS | OXYGEN SATURATION: 98 % | DIASTOLIC BLOOD PRESSURE: 102 MMHG | RESPIRATION RATE: 18 BRPM

## 2019-03-02 DIAGNOSIS — B96.89 ACUTE BACTERIAL SINUSITIS: ICD-10-CM

## 2019-03-02 DIAGNOSIS — J06.9 UPPER RESPIRATORY TRACT INFECTION, UNSPECIFIED TYPE: Primary | ICD-10-CM

## 2019-03-02 DIAGNOSIS — H66.001 ACUTE SUPPURATIVE OTITIS MEDIA OF RIGHT EAR WITHOUT SPONTANEOUS RUPTURE OF TYMPANIC MEMBRANE, RECURRENCE NOT SPECIFIED: ICD-10-CM

## 2019-03-02 DIAGNOSIS — J01.90 ACUTE BACTERIAL SINUSITIS: ICD-10-CM

## 2019-03-02 LAB
INFLUENZA A ANTIBODY: NEGATIVE
INFLUENZA B ANTIBODY: NEGATIVE

## 2019-03-02 PROCEDURE — 87804 INFLUENZA ASSAY W/OPTIC: CPT | Performed by: FAMILY MEDICINE

## 2019-03-02 PROCEDURE — 99213 OFFICE O/P EST LOW 20 MIN: CPT | Performed by: FAMILY MEDICINE

## 2019-03-02 RX ORDER — LEVOFLOXACIN 500 MG/1
500 TABLET, FILM COATED ORAL DAILY
Qty: 10 TABLET | Refills: 0 | Status: SHIPPED | OUTPATIENT
Start: 2019-03-02 | End: 2019-03-12

## 2019-03-02 ASSESSMENT — ENCOUNTER SYMPTOMS
SORE THROAT: 1
COUGH: 0
SINUS PRESSURE: 1
SINUS COMPLAINT: 1
SWOLLEN GLANDS: 0

## 2019-03-08 ENCOUNTER — OFFICE VISIT (OUTPATIENT)
Dept: INTERNAL MEDICINE | Age: 44
End: 2019-03-08
Payer: COMMERCIAL

## 2019-03-08 VITALS — SYSTOLIC BLOOD PRESSURE: 140 MMHG | HEART RATE: 84 BPM | OXYGEN SATURATION: 98 % | DIASTOLIC BLOOD PRESSURE: 80 MMHG

## 2019-03-08 DIAGNOSIS — I10 ESSENTIAL HYPERTENSION: Primary | ICD-10-CM

## 2019-03-08 PROCEDURE — 99213 OFFICE O/P EST LOW 20 MIN: CPT | Performed by: INTERNAL MEDICINE

## 2019-03-08 ASSESSMENT — ENCOUNTER SYMPTOMS
EYE ITCHING: 0
VOMITING: 0
ABDOMINAL DISTENTION: 0
SORE THROAT: 0
NAUSEA: 0
ABDOMINAL PAIN: 0
BLOOD IN STOOL: 0
SINUS PRESSURE: 0
EYE DISCHARGE: 0
WHEEZING: 0
SHORTNESS OF BREATH: 0
TROUBLE SWALLOWING: 0
BACK PAIN: 0

## 2019-03-08 ASSESSMENT — PATIENT HEALTH QUESTIONNAIRE - PHQ9
SUM OF ALL RESPONSES TO PHQ QUESTIONS 1-9: 0
1. LITTLE INTEREST OR PLEASURE IN DOING THINGS: 0
2. FEELING DOWN, DEPRESSED OR HOPELESS: 0
SUM OF ALL RESPONSES TO PHQ QUESTIONS 1-9: 0
SUM OF ALL RESPONSES TO PHQ9 QUESTIONS 1 & 2: 0

## 2019-03-22 ENCOUNTER — TELEPHONE (OUTPATIENT)
Dept: INTERNAL MEDICINE | Age: 44
End: 2019-03-22

## 2019-03-22 RX ORDER — QUINAPRIL 40 MG/1
40 TABLET ORAL 2 TIMES DAILY
Qty: 180 TABLET | Refills: 2 | Status: SHIPPED | OUTPATIENT
Start: 2019-03-22 | End: 2019-04-01

## 2019-04-01 ENCOUNTER — TELEPHONE (OUTPATIENT)
Dept: INTERNAL MEDICINE | Age: 44
End: 2019-04-01

## 2019-04-01 RX ORDER — LOSARTAN POTASSIUM AND HYDROCHLOROTHIAZIDE 25; 100 MG/1; MG/1
1 TABLET ORAL DAILY
Qty: 30 TABLET | Refills: 2 | Status: SHIPPED | OUTPATIENT
Start: 2019-04-01 | End: 2019-06-29 | Stop reason: SDUPTHER

## 2019-06-04 DIAGNOSIS — M26.609 JOINT DISEASE, TEMPOROMANDIBULAR: ICD-10-CM

## 2019-06-04 RX ORDER — MELOXICAM 15 MG/1
TABLET ORAL
Qty: 15 TABLET | Refills: 0 | Status: SHIPPED | OUTPATIENT
Start: 2019-06-04 | End: 2019-07-23 | Stop reason: SDUPTHER

## 2019-06-04 NOTE — TELEPHONE ENCOUNTER
[facility] called requesting a refill of the below medication which has been pended for you:     Requested Prescriptions     Pending Prescriptions Disp Refills    meloxicam (MOBIC) 15 MG tablet [Pharmacy Med Name: MELOXICAM 15 MG TABLET *CAMERON*] 15 tablet 0     Sig: TAKE 1/2 TABLET BY MOUTH 2 TIMES A DAY       Last Appointment Date: 3/8/2019  Next Appointment Date: 8/27/2019    No Known Allergies

## 2019-06-26 ENCOUNTER — OFFICE VISIT (OUTPATIENT)
Dept: OTOLARYNGOLOGY | Age: 44
End: 2019-06-26
Payer: COMMERCIAL

## 2019-06-26 VITALS
DIASTOLIC BLOOD PRESSURE: 80 MMHG | HEART RATE: 101 BPM | RESPIRATION RATE: 18 BRPM | SYSTOLIC BLOOD PRESSURE: 144 MMHG | WEIGHT: 286.4 LBS | BODY MASS INDEX: 46.23 KG/M2 | TEMPERATURE: 98.9 F

## 2019-06-26 DIAGNOSIS — M26.69 TMJ CAPSULITIS: Primary | ICD-10-CM

## 2019-06-26 DIAGNOSIS — H92.01 OTALGIA OF RIGHT EAR: ICD-10-CM

## 2019-06-26 DIAGNOSIS — F45.8 BRUXISM: ICD-10-CM

## 2019-06-26 DIAGNOSIS — H61.21 IMPACTED CERUMEN OF RIGHT EAR: ICD-10-CM

## 2019-06-26 PROCEDURE — 99203 OFFICE O/P NEW LOW 30 MIN: CPT | Performed by: OTOLARYNGOLOGY

## 2019-06-26 NOTE — PROGRESS NOTES
[facility]  [address] [facility] [address]  Dept: [phone]  Dept Fax: [phone]  Loc: [phone]     Yu Daugherty is a 37 y.o. female who presents today for her medical conditions/complaintsas noted below. Yu Daugherty is c/o of New Patient (referral from - for ear pain)      Past Medical History:   Diagnosis Date    Allergic rhinitis     Asthma     Chronic pansinusitis 8/1/2017    Essential hypertension 11/17/2017    Gastroesophageal reflux disease without esophagitis 10/16/2018    Herpes zoster without complication 7/9/9271    Osteoarthritis 10/16/2018    Simple chronic bronchitis ([address] 8/1/2017      Past Surgical History:   Procedure Laterality Date    HYSTERECTOMY      TONSILLECTOMY         History reviewed. No pertinent family history. Social History     Tobacco Use    Smoking status: Never Smoker    Smokeless tobacco: Never Used   Substance Use Topics    Alcohol use: No         Current Outpatient Medications   Medication Sig Dispense Refill    meloxicam (MOBIC) 15 MG tablet TAKE 1/2 TABLET BY MOUTH 2 TIMES A DAY 15 tablet 0    losartan-hydrochlorothiazide (HYZAAR) 100-25 MG per tablet Take 1 tablet by mouth daily 30 tablet 2    omeprazole (PRILOSEC) 20 MG delayed release capsule Take 20 mg by mouth daily      clotrimazole-betamethasone (LOTRISONE) 1-0.05 % cream Apply topically 2 times daily. 1 Tube 2    fluticasone (FLONASE) 50 MCG/ACT nasal spray       levocetirizine (XYZAL) 5 MG tablet       EPIPEN 2-SERGIO 0.3 MG/0.3ML SOAJ injection       PROAIR  (90 Base) MCG/ACT inhaler        No current facility-administered medications for this visit. No Known Allergies    Subjective:   Right ear pain since March. Bruxism- and associated ear pain / discomfort. The chief complaint centers around the ear area, temporomandibular joint capsule, upper neck region, and stylohyoid ligament.  The pain is intermittent but sometimes can be more constant. The pain may fluctuate in intensity and sometimes be more severe. This pain has been present for 3 months. The patient points to the region of the ear canal and joint capsule and the infra-auricular area when describing the focal point of the pain. There has been no appreciable progression of these symptoms since they began. Aware of clenching and/or grinding. Review of Systems  A 12 point review of systems was completed, reviewed, and scanned to chart per staff. Patient medical history reviewed. Objective:      Physical Exam  BP (!) 144/80 Comment: left  Pulse 101   Temp 98.9 °F (37.2 °C)   Resp 18   Wt 286 lb 6.4 oz (129.9 kg)   BMI 46.23 kg/m²   Physical Exam:     General appearance: Normally developed structures of the head and neck with no deformities. Ability to communicate: Normal voice with ability to convey appropriately the nature of their complaints. Head and Face: Normal appearance with no visible lesions of the skin. Sinus tenderness: None appreciated on exam. No areas of facial swelling. Facial strength: No weakness of the facial muscles appreciated. Otoscopic: Normal external ear canals and tympanic membranes. Hearing assessment: normal to soft voice and finger rub. External ears and nose: normal.   Nasal exam: Anterior speculum exam normal with no polyps purulence or lesions. Oral:  Mucosa of buccal, floor of mouth, and palatal areas appear normal and free of any lesions. Lips, teeth, gingiva: Normal with no lesions. Oropharynx: Tongue reveals normal appearance and mobility. Oral mucosa of buccal, floor of mouth, and palatal areas appear normal and free of any lesions or ulcerations. Salivary:  Examination of the parotid and submandibular glands was normal with no palpable masses, nodules or irregularities. Neck: No gross swellings or deformities. No palpable lymphadenopathy. Thyroid normal without palpable masses. Respiratory:  Effort appears normal with no notable distress, stridor,accessory muscle usage or tachypnea     The patient has impacted cerumen. Impacted cerumen was safely removed from the right ear with appropriate instrumentation viewing through an operating microscope. It was necessary to use the microscope. Cerumen was removed as well as other debris uneventfully with special instruments including a day hook, curette, and ear suction. Fiberoptic exam- wnl  Due to the nature of the patient's complaints and/or differential diagnosis, a fiberoptic exam to evaluate the nasopharynx, hypopharynx and larynx is warranted. Anesthesia of topical lidocaine and kale-synephrine was administered intranasally and a fiberoptic scope was introduced transnasnasally to evaluate the anatomy. The nasopharynx, vallecula, tongue base, and pharyngeal mucosa were visualized as well. The patient was asked to phonate and perform maneuvers to facilitate the exam.    The above exam was completely normal and free of any lesions or mucosal abnormalities. Assessment:       Diagnosis Orders   1. TMJ capsulitis     2. Otalgia of right ear     3. Bruxism     4. Impacted cerumen of right ear           Plan:        Impression / Plan:   Head and Neck discomfort associated with this activity that can include ear pain and pain in area musculature, especially the muscles of mastication and upper neck near skull base. No evidence of any significant disease process or lesions in a patient with pain involving the jaw joint and adjacent capsule. Return prn should any other symptoms arise or worsen. Condition explained and options include a , relaxation therapy, nad Oral Surgery consultation for TMJ. Note:  A total of > 50%  (of the total minutes) of this 30 minute visit was spent discussing the pathophysiology and treatment options and/or coordination of care of the above dx.       shiloh Ferraro for and in the presence of Dr. Ray Mendez June 26, 2019/3:28 PM/Latoya Mendez. Ron Bianchi MD,  I personally performed the services described in this documentation as scribed by Bebeto Quinteros in my presence and it appears accurate and complete.

## 2019-07-01 RX ORDER — LOSARTAN POTASSIUM AND HYDROCHLOROTHIAZIDE 25; 100 MG/1; MG/1
TABLET ORAL
Qty: 30 TABLET | Refills: 0 | Status: SHIPPED | OUTPATIENT
Start: 2019-07-01 | End: 2019-08-19 | Stop reason: SDUPTHER

## 2019-07-23 DIAGNOSIS — M26.609 JOINT DISEASE, TEMPOROMANDIBULAR: ICD-10-CM

## 2019-07-23 RX ORDER — MELOXICAM 15 MG/1
TABLET ORAL
Qty: 30 TABLET | Refills: 5 | Status: SHIPPED | OUTPATIENT
Start: 2019-07-23 | End: 2020-01-21

## 2019-08-19 RX ORDER — LOSARTAN POTASSIUM AND HYDROCHLOROTHIAZIDE 25; 100 MG/1; MG/1
TABLET ORAL
Qty: 30 TABLET | Refills: 0 | Status: SHIPPED | OUTPATIENT
Start: 2019-08-19 | End: 2019-09-21 | Stop reason: SDUPTHER

## 2019-08-19 NOTE — TELEPHONE ENCOUNTER
Tata Carson called requesting a refill of the below medication which has been pended for you:     Requested Prescriptions     Pending Prescriptions Disp Refills    losartan-hydrochlorothiazide (HYZAAR) 100-25 MG per tablet [Pharmacy Med Name: LOSARTAN-HCTZ 100-25 MG TAB] 30 tablet 0     Sig: TAKE ONE TABLET BY MOUTH EVERY DAY       Last Appointment Date: 3/8/2019  Next Appointment Date: 8/30/2019    No Known Allergies

## 2019-08-30 ENCOUNTER — OFFICE VISIT (OUTPATIENT)
Dept: INTERNAL MEDICINE | Age: 44
End: 2019-08-30
Payer: COMMERCIAL

## 2019-08-30 VITALS
WEIGHT: 285 LBS | SYSTOLIC BLOOD PRESSURE: 132 MMHG | OXYGEN SATURATION: 99 % | HEIGHT: 66 IN | BODY MASS INDEX: 45.8 KG/M2 | HEART RATE: 94 BPM | DIASTOLIC BLOOD PRESSURE: 70 MMHG

## 2019-08-30 DIAGNOSIS — E78.2 MIXED HYPERLIPIDEMIA: ICD-10-CM

## 2019-08-30 DIAGNOSIS — I10 ESSENTIAL HYPERTENSION: Primary | ICD-10-CM

## 2019-08-30 DIAGNOSIS — K21.9 GASTROESOPHAGEAL REFLUX DISEASE WITHOUT ESOPHAGITIS: ICD-10-CM

## 2019-08-30 DIAGNOSIS — M19.90 OSTEOARTHRITIS, UNSPECIFIED OSTEOARTHRITIS TYPE, UNSPECIFIED SITE: ICD-10-CM

## 2019-08-30 PROCEDURE — 99214 OFFICE O/P EST MOD 30 MIN: CPT | Performed by: INTERNAL MEDICINE

## 2019-08-30 ASSESSMENT — ENCOUNTER SYMPTOMS
ABDOMINAL DISTENTION: 0
EYE DISCHARGE: 0
EYE ITCHING: 0
BACK PAIN: 0
SORE THROAT: 0
TROUBLE SWALLOWING: 0
BLOOD IN STOOL: 0
WHEEZING: 0
ABDOMINAL PAIN: 0
SHORTNESS OF BREATH: 0
SINUS PRESSURE: 0
VOMITING: 0
NAUSEA: 0

## 2019-09-23 RX ORDER — LOSARTAN POTASSIUM AND HYDROCHLOROTHIAZIDE 12.5; 5 MG/1; MG/1
TABLET ORAL
Qty: 60 TABLET | Refills: 0 | Status: SHIPPED | OUTPATIENT
Start: 2019-09-23 | End: 2019-10-22 | Stop reason: SDUPTHER

## 2019-10-22 RX ORDER — LOSARTAN POTASSIUM AND HYDROCHLOROTHIAZIDE 12.5; 5 MG/1; MG/1
TABLET ORAL
Qty: 60 TABLET | Refills: 0 | Status: SHIPPED | OUTPATIENT
Start: 2019-10-22 | End: 2019-11-20 | Stop reason: SDUPTHER

## 2019-11-20 RX ORDER — LOSARTAN POTASSIUM AND HYDROCHLOROTHIAZIDE 12.5; 5 MG/1; MG/1
TABLET ORAL
Qty: 60 TABLET | Refills: 3 | Status: SHIPPED
Start: 2019-11-20 | End: 2020-09-08 | Stop reason: CLARIF

## 2020-01-21 RX ORDER — MELOXICAM 15 MG/1
TABLET ORAL
Qty: 30 TABLET | Refills: 0 | Status: SHIPPED | OUTPATIENT
Start: 2020-01-21 | End: 2020-02-18

## 2020-02-18 RX ORDER — MELOXICAM 15 MG/1
TABLET ORAL
Qty: 30 TABLET | Refills: 1 | Status: SHIPPED | OUTPATIENT
Start: 2020-02-18 | End: 2020-04-09

## 2020-02-18 NOTE — TELEPHONE ENCOUNTER
Catalina  called requesting a refill of the below medication which has been pended for you:     Requested Prescriptions     Pending Prescriptions Disp Refills    meloxicam (MOBIC) 15 MG tablet [Pharmacy Med Name: MELOXICAM 15 MG TABLET *CAMERON*] 30 tablet 1     Sig: TAKE 1/2 TABLET BY MOUTH 2 TIMES A DAY       Last Appointment Date: 8/30/2019  Next Appointment Date: 3/3/2020    No Known Allergies

## 2020-03-03 ENCOUNTER — OFFICE VISIT (OUTPATIENT)
Dept: INTERNAL MEDICINE | Age: 45
End: 2020-03-03
Payer: COMMERCIAL

## 2020-03-03 VITALS
SYSTOLIC BLOOD PRESSURE: 126 MMHG | OXYGEN SATURATION: 98 % | HEART RATE: 81 BPM | WEIGHT: 293 LBS | BODY MASS INDEX: 47.29 KG/M2 | DIASTOLIC BLOOD PRESSURE: 80 MMHG

## 2020-03-03 PROCEDURE — 99214 OFFICE O/P EST MOD 30 MIN: CPT | Performed by: INTERNAL MEDICINE

## 2020-03-03 ASSESSMENT — ENCOUNTER SYMPTOMS
TROUBLE SWALLOWING: 0
VOMITING: 0
SORE THROAT: 0
ABDOMINAL DISTENTION: 0
NAUSEA: 0
EYE DISCHARGE: 0
SINUS PRESSURE: 0
BACK PAIN: 0
BLOOD IN STOOL: 0
ABDOMINAL PAIN: 0
WHEEZING: 0
EYE ITCHING: 0
SHORTNESS OF BREATH: 0

## 2020-03-03 ASSESSMENT — PATIENT HEALTH QUESTIONNAIRE - PHQ9
2. FEELING DOWN, DEPRESSED OR HOPELESS: 0
SUM OF ALL RESPONSES TO PHQ QUESTIONS 1-9: 0
SUM OF ALL RESPONSES TO PHQ QUESTIONS 1-9: 0
SUM OF ALL RESPONSES TO PHQ9 QUESTIONS 1 & 2: 0
1. LITTLE INTEREST OR PLEASURE IN DOING THINGS: 0

## 2020-03-03 NOTE — PROGRESS NOTES
Negative for activity change, appetite change, fatigue and fever. HENT: Negative for congestion, hearing loss, sinus pressure, sore throat and trouble swallowing. Eyes: Negative for discharge and itching. Respiratory: Negative for shortness of breath and wheezing. Cardiovascular: Negative for chest pain, palpitations and leg swelling. Gastrointestinal: Negative for abdominal distention, abdominal pain, blood in stool, nausea and vomiting. Endocrine: Negative for cold intolerance, heat intolerance and polydipsia. Genitourinary: Negative for flank pain, frequency, hematuria and urgency. Musculoskeletal: Negative for arthralgias, back pain and joint swelling. Skin: Negative for rash and wound. Allergic/Immunologic: Negative for environmental allergies and food allergies. Neurological: Negative for dizziness, tremors, syncope, weakness, numbness and headaches. Hematological: Negative for adenopathy. Psychiatric/Behavioral: Negative for agitation and hallucinations. The patient is not nervous/anxious. Objective:      /80   Pulse 81   Wt 293 lb (132.9 kg)   SpO2 98%   BMI 47.29 kg/m²    Physical Exam  Constitutional:       General: She is not in acute distress. Appearance: She is well-developed. She is not diaphoretic. HENT:      Head: Normocephalic and atraumatic. Right Ear: External ear normal.      Left Ear: External ear normal.      Nose: Nose normal.      Mouth/Throat:      Pharynx: No oropharyngeal exudate. Eyes:      General: No scleral icterus. Right eye: No discharge. Left eye: No discharge. Conjunctiva/sclera: Conjunctivae normal.      Pupils: Pupils are equal, round, and reactive to light. Neck:      Musculoskeletal: Normal range of motion and neck supple. Thyroid: No thyromegaly. Vascular: No JVD. Trachea: No tracheal deviation. Cardiovascular:      Rate and Rhythm: Normal rate and regular rhythm.       Heart sounds: Normal heart sounds. No murmur. No friction rub. No gallop. Pulmonary:      Effort: Pulmonary effort is normal. No respiratory distress. Breath sounds: Normal breath sounds. No wheezing or rales. Abdominal:      General: Bowel sounds are normal. There is no distension. Palpations: Abdomen is soft. There is no mass. Tenderness: There is no abdominal tenderness. There is no guarding or rebound. Musculoskeletal: Normal range of motion. General: No tenderness or deformity. Lymphadenopathy:      Cervical: No cervical adenopathy. Skin:     General: Skin is warm and dry. Coloration: Skin is not pale. Findings: No erythema or rash. Neurological:      Mental Status: She is alert and oriented to person, place, and time. Cranial Nerves: No cranial nerve deficit. Motor: No abnormal muscle tone. Coordination: Coordination normal.      Deep Tendon Reflexes: Reflexes are normal and symmetric. Reflexes normal.   Psychiatric:         Behavior: Behavior normal.         Thought Content: Thought content normal.         Judgment: Judgment normal.          Assessment:      Diagnosis Orders   1. Essential hypertension  CBC Auto Differential    Comprehensive Metabolic Panel    Lipid Panel   2. Mixed hyperlipidemia  CBC Auto Differential    Comprehensive Metabolic Panel    Lipid Panel   3. Gastroesophageal reflux disease without esophagitis  CBC Auto Differential    Comprehensive Metabolic Panel    Lipid Panel            Plan:     Essential hypertension which is under great control. Blood pressure is 126/80. She is on losartan/HCTZ and she is tolerating it well and is going to continue the same dose. Hyperlipidemia. Lipids are up a little bit. 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 She is going to try to get out and do more and be more active. Gastroesophageal reflux.   She is on omeprazole with good results and will

## 2020-04-09 RX ORDER — LOSARTAN POTASSIUM AND HYDROCHLOROTHIAZIDE 25; 100 MG/1; MG/1
TABLET ORAL
Qty: 30 TABLET | Refills: 11 | Status: SHIPPED | OUTPATIENT
Start: 2020-04-09 | End: 2021-03-08 | Stop reason: SDUPTHER

## 2020-04-09 RX ORDER — MELOXICAM 15 MG/1
TABLET ORAL
Qty: 30 TABLET | Refills: 11 | Status: SHIPPED | OUTPATIENT
Start: 2020-04-09 | End: 2021-03-08 | Stop reason: SDUPTHER

## 2020-09-08 ENCOUNTER — OFFICE VISIT (OUTPATIENT)
Dept: INTERNAL MEDICINE | Age: 45
End: 2020-09-08
Payer: COMMERCIAL

## 2020-09-08 VITALS
BODY MASS INDEX: 46.61 KG/M2 | DIASTOLIC BLOOD PRESSURE: 78 MMHG | SYSTOLIC BLOOD PRESSURE: 122 MMHG | HEIGHT: 66 IN | HEART RATE: 86 BPM | OXYGEN SATURATION: 98 % | WEIGHT: 290 LBS

## 2020-09-08 PROBLEM — R73.09 ELEVATED GLUCOSE: Status: ACTIVE | Noted: 2020-09-08

## 2020-09-08 PROCEDURE — 99214 OFFICE O/P EST MOD 30 MIN: CPT | Performed by: INTERNAL MEDICINE

## 2020-09-08 ASSESSMENT — ENCOUNTER SYMPTOMS
SORE THROAT: 0
ABDOMINAL DISTENTION: 0
EYE DISCHARGE: 0
BACK PAIN: 0
ABDOMINAL PAIN: 0
TROUBLE SWALLOWING: 0
EYE ITCHING: 0
WHEEZING: 0
SINUS PRESSURE: 0
BLOOD IN STOOL: 0
VOMITING: 0
NAUSEA: 0
SHORTNESS OF BREATH: 0

## 2020-09-08 NOTE — PROGRESS NOTES
[address] [facility]  [address] [address] [place] [address]  Dept: [phone]  Dept Fax: [phone] 33: [phone]      Visit Date: 9/8/2020    Kulwinder Ma a 50 y.o. female who presents today for:  Chief Complaint   Patient presents with    Annual Exam         HPI:     50years old in for her annual exam.  She has a history of chronic sinusitis, hypertension gastric reflux and her blood sugar is becoming elevated. Past Medical History:   Diagnosis Date    Allergic rhinitis     Asthma     Chronic pansinusitis 8/1/2017    Essential hypertension 11/17/2017    Gastroesophageal reflux disease without esophagitis 10/16/2018    Herpes zoster without complication 7/0/8285    Mixed hyperlipidemia 8/30/2019    Osteoarthritis 10/16/2018    Simple chronic bronchitis ([address] 8/1/2017      Past Surgical History:   Procedure Laterality Date    HYSTERECTOMY      TONSILLECTOMY         No family history on file. Social History     Tobacco Use    Smoking status: Never Smoker    Smokeless tobacco: Never Used   Substance Use Topics    Alcohol use: No      Current Outpatient Medications   Medication Sig Dispense Refill    losartan-hydrochlorothiazide (HYZAAR) 100-25 MG per tablet TAKE (1) TABLET BY MOUTH EVERY DAY **NEW STRENGTH** 30 tablet 11    meloxicam (MOBIC) 15 MG tablet TAKE 1/2 TABLET BY MOUTH 2 TIMES A DAY 30 tablet 11    omeprazole (PRILOSEC) 20 MG delayed release capsule Take 20 mg by mouth daily      fluticasone (FLONASE) 50 MCG/ACT nasal spray       levocetirizine (XYZAL) 5 MG tablet       EPIPEN 2-SERGIO 0.3 MG/0.3ML SOAJ injection       PROAIR  (90 Base) MCG/ACT inhaler        No current facility-administered medications for this visit. No Known Allergies      Subjective:     Review of Systems   Constitutional: Negative for activity change, appetite change, fatigue and fever.    HENT: Negative for congestion, hearing loss, sinus pressure, sore throat and trouble swallowing. Eyes: Negative for discharge and itching. Respiratory: Negative for shortness of breath and wheezing. Cardiovascular: Negative for chest pain, palpitations and leg swelling. Gastrointestinal: Negative for abdominal distention, abdominal pain, blood in stool, nausea and vomiting. Endocrine: Negative for cold intolerance, heat intolerance and polydipsia. Genitourinary: Negative for flank pain, frequency, hematuria and urgency. Musculoskeletal: Negative for arthralgias, back pain and joint swelling. Skin: Negative for rash and wound. Allergic/Immunologic: Negative for environmental allergies and food allergies. Neurological: Negative for dizziness, tremors, syncope, weakness, numbness and headaches. Hematological: Negative for adenopathy. Psychiatric/Behavioral: Negative for agitation and hallucinations. The patient is not nervous/anxious. Objective:      /78   Pulse 86   Ht 5' 6\" (1.676 m)   Wt 290 lb (131.5 kg)   SpO2 98%   BMI 46.81 kg/m²    Physical Exam  Constitutional:       General: She is not in acute distress. Appearance: She is well-developed. She is obese. She is not diaphoretic. HENT:      Head: Normocephalic and atraumatic. Right Ear: External ear normal.      Left Ear: External ear normal.      Nose: Nose normal.      Mouth/Throat:      Pharynx: No oropharyngeal exudate. Eyes:      General: No scleral icterus. Right eye: No discharge. Left eye: No discharge. Conjunctiva/sclera: Conjunctivae normal.      Pupils: Pupils are equal, round, and reactive to light. Neck:      Musculoskeletal: Normal range of motion and neck supple. Thyroid: No thyromegaly. Vascular: No JVD. Trachea: No tracheal deviation. Cardiovascular:      Rate and Rhythm: Normal rate and regular rhythm. Heart sounds: Normal heart sounds. No murmur. No friction rub. No gallop.     Pulmonary: Effort: Pulmonary effort is normal. No respiratory distress. Breath sounds: Normal breath sounds. No wheezing or rales. Abdominal:      General: Bowel sounds are normal. There is no distension. Palpations: Abdomen is soft. There is no mass. Tenderness: There is no abdominal tenderness. There is no guarding or rebound. Musculoskeletal: Normal range of motion. General: No tenderness or deformity. Lymphadenopathy:      Cervical: No cervical adenopathy. Skin:     General: Skin is warm and dry. Coloration: Skin is not pale. Findings: No erythema or rash. Neurological:      Mental Status: She is alert and oriented to person, place, and time. Cranial Nerves: No cranial nerve deficit. Motor: No abnormal muscle tone. Coordination: Coordination normal.      Deep Tendon Reflexes: Reflexes are normal and symmetric. Reflexes normal.   Psychiatric:         Behavior: Behavior normal.         Thought Content: Thought content normal.         Judgment: Judgment normal.          Assessment:      Diagnosis Orders   1. Essential hypertension  Lipid Panel    Hemoglobin A1C    Comprehensive Metabolic Panel   2. Gastroesophageal reflux disease without esophagitis  Lipid Panel    Hemoglobin A1C    Comprehensive Metabolic Panel   3. Mixed hyperlipidemia  Lipid Panel    Hemoglobin A1C    Comprehensive Metabolic Panel   4. Osteoarthritis, unspecified osteoarthritis type, unspecified site  Lipid Panel    Hemoglobin A1C    Comprehensive Metabolic Panel   5. Elevated glucose  Lipid Panel    Hemoglobin A1C    Comprehensive Metabolic Panel            Plan:     Essential hypertension. Blood pressure is 122/78. She is tolerating her medication with no side effects tries to watch her salt. She is on losartan 100/25 daily. She is going to continue the same dose. Gastroesophageal reflux which is stable. She continues with daily use of Prilosec and is going to continue it.   She knows

## 2021-03-08 ENCOUNTER — OFFICE VISIT (OUTPATIENT)
Dept: INTERNAL MEDICINE | Age: 46
End: 2021-03-08
Payer: COMMERCIAL

## 2021-03-08 VITALS
SYSTOLIC BLOOD PRESSURE: 136 MMHG | WEIGHT: 288 LBS | HEIGHT: 66 IN | DIASTOLIC BLOOD PRESSURE: 74 MMHG | BODY MASS INDEX: 46.28 KG/M2 | HEART RATE: 90 BPM

## 2021-03-08 DIAGNOSIS — M26.609 JOINT DISEASE, TEMPOROMANDIBULAR: ICD-10-CM

## 2021-03-08 DIAGNOSIS — K21.9 GASTROESOPHAGEAL REFLUX DISEASE WITHOUT ESOPHAGITIS: ICD-10-CM

## 2021-03-08 DIAGNOSIS — R73.09 ELEVATED GLUCOSE: Primary | ICD-10-CM

## 2021-03-08 DIAGNOSIS — I10 ESSENTIAL HYPERTENSION: ICD-10-CM

## 2021-03-08 PROCEDURE — 99214 OFFICE O/P EST MOD 30 MIN: CPT | Performed by: INTERNAL MEDICINE

## 2021-03-08 RX ORDER — MELOXICAM 15 MG/1
TABLET ORAL
Qty: 90 TABLET | Refills: 3 | Status: SHIPPED | OUTPATIENT
Start: 2021-03-08 | End: 2021-07-09 | Stop reason: SDUPTHER

## 2021-03-08 RX ORDER — EPINEPHRINE 0.3 MG/.3ML
INJECTION INTRAMUSCULAR
Qty: 1 EACH | Refills: 5 | Status: SHIPPED | OUTPATIENT
Start: 2021-03-08

## 2021-03-08 RX ORDER — LOSARTAN POTASSIUM AND HYDROCHLOROTHIAZIDE 25; 100 MG/1; MG/1
TABLET ORAL
Qty: 90 TABLET | Refills: 3 | Status: SHIPPED | OUTPATIENT
Start: 2021-03-08 | End: 2021-07-09 | Stop reason: SDUPTHER

## 2021-03-08 ASSESSMENT — ENCOUNTER SYMPTOMS
SHORTNESS OF BREATH: 0
TROUBLE SWALLOWING: 0
NAUSEA: 0
EYE ITCHING: 0
BLOOD IN STOOL: 0
VOMITING: 0
ABDOMINAL PAIN: 0
BACK PAIN: 0
WHEEZING: 0
SINUS PRESSURE: 0
ABDOMINAL DISTENTION: 0
EYE DISCHARGE: 0
SORE THROAT: 0

## 2021-03-08 NOTE — PROGRESS NOTES
[address] [facility]  [address] [address] [address] [place] [address]  Dept: [phone]  Dept Fax: [phone]: [phone]      Visit Date: 3/8/2021    Martina glez 50 y.o. female who presents today for:  Chief Complaint   Patient presents with    6 Month Follow-Up    Hypertension    Other     bilat feet pain         HPI:     Is in for 6-month visit. She feels okay. She had Covid back in January and she has been having neuropathy in her feet ever since then. She is a diabetic with lab work to review. Past Medical History:   Diagnosis Date    Allergic rhinitis     Asthma     Chronic pansinusitis 8/1/2017    Essential hypertension 11/17/2017    Gastroesophageal reflux disease without esophagitis 10/16/2018    Herpes zoster without complication 3/1/3955    Mixed hyperlipidemia 8/30/2019    Osteoarthritis 10/16/2018    Simple chronic bronchitis ([address] 8/1/2017      Past Surgical History:   Procedure Laterality Date    HYSTERECTOMY      TONSILLECTOMY         No family history on file. Social History     Tobacco Use    Smoking status: Never Smoker    Smokeless tobacco: Never Used   Substance Use Topics    Alcohol use: No      Current Outpatient Medications   Medication Sig Dispense Refill    EPIPEN 2-SERGIO 0.3 MG/0.3ML SOAJ injection prn 1 each 5    losartan-hydroCHLOROthiazide (HYZAAR) 100-25 MG per tablet TAKE (1) TABLET BY MOUTH EVERY DAY **NEW STRENGTH** 90 tablet 3    meloxicam (MOBIC) 15 MG tablet Once dly 90 tablet 3    omeprazole (PRILOSEC) 20 MG delayed release capsule Take 20 mg by mouth daily      fluticasone (FLONASE) 50 MCG/ACT nasal spray       levocetirizine (XYZAL) 5 MG tablet       PROAIR  (90 Base) MCG/ACT inhaler        No current facility-administered medications for this visit.       No Known Allergies      Subjective:     Review of Systems   Constitutional: Negative for activity change, appetite change, fatigue and fever. HENT: Negative for congestion, hearing loss, sinus pressure, sore throat and trouble swallowing. Eyes: Negative for discharge and itching. Respiratory: Negative for shortness of breath and wheezing. Cardiovascular: Negative for chest pain, palpitations and leg swelling. Gastrointestinal: Negative for abdominal distention, abdominal pain, blood in stool, nausea and vomiting. Endocrine: Negative for cold intolerance, heat intolerance and polydipsia. Genitourinary: Negative for flank pain, frequency, hematuria and urgency. Musculoskeletal: Negative for arthralgias, back pain and joint swelling. Neuropathy in her feet since her Covid infection the middle of January   Skin: Negative for rash and wound. Allergic/Immunologic: Negative for environmental allergies and food allergies. Neurological: Negative for dizziness, tremors, syncope, weakness, numbness and headaches. Hematological: Negative for adenopathy. Psychiatric/Behavioral: Negative for agitation and hallucinations. The patient is not nervous/anxious. Objective:      /74   Pulse 90   Ht 5' 6\" (1.676 m)   Wt 288 lb (130.6 kg)   BMI 46.48 kg/m²    Physical Exam  Constitutional:       General: She is not in acute distress. Appearance: She is well-developed. She is not diaphoretic. HENT:      Head: Normocephalic and atraumatic. Right Ear: External ear normal.      Left Ear: External ear normal.      Nose: Nose normal.      Mouth/Throat:      Pharynx: No oropharyngeal exudate. Eyes:      General: No scleral icterus. Right eye: No discharge. Left eye: No discharge. Conjunctiva/sclera: Conjunctivae normal.      Pupils: Pupils are equal, round, and reactive to light. Neck:      Musculoskeletal: Normal range of motion and neck supple. Thyroid: No thyromegaly. Vascular: No JVD. Trachea: No tracheal deviation.    Cardiovascular:      Rate and Rhythm: Normal rate and regular rhythm. Heart sounds: Normal heart sounds. No murmur. No friction rub. No gallop. Pulmonary:      Effort: Pulmonary effort is normal. No respiratory distress. Breath sounds: Normal breath sounds. No wheezing or rales. Abdominal:      General: Bowel sounds are normal. There is no distension. Palpations: Abdomen is soft. There is no mass. Tenderness: There is no abdominal tenderness. There is no guarding or rebound. Musculoskeletal: Normal range of motion. General: No tenderness or deformity. Lymphadenopathy:      Cervical: No cervical adenopathy. Skin:     General: Skin is warm and dry. Coloration: Skin is not pale. Findings: No erythema or rash. Neurological:      Mental Status: She is alert and oriented to person, place, and time. Cranial Nerves: No cranial nerve deficit. Motor: No abnormal muscle tone. Coordination: Coordination normal.      Deep Tendon Reflexes: Reflexes are normal and symmetric. Reflexes normal.   Psychiatric:         Behavior: Behavior normal.         Thought Content: Thought content normal.         Judgment: Judgment normal.          Assessment:      Diagnosis Orders   1. Elevated glucose  Comprehensive Metabolic Panel    Hemoglobin A1C   2. Joint disease, temporomandibular  meloxicam (MOBIC) 15 MG tablet    Comprehensive Metabolic Panel    Hemoglobin A1C   3. Essential hypertension  Comprehensive Metabolic Panel    Hemoglobin A1C   4. Gastroesophageal reflux disease without esophagitis  Comprehensive Metabolic Panel    Hemoglobin A1C            Plan:     Elevaded glucose by history. She is doing okay. Hemoglobin A1c looks good at 6.2. Her fasting blood sugar is 120. Joint disease which is stable. She has arthritis and she has meloxicam for use.  The only thing she complains about is neuropathy that is gotten worse since the Covid infection she had the middle of January and I think it will probably improve as time goes on. Essential hypertension. Her blood pressure look good at 136/74. She is on Hyzaar which she tolerates well and will continue the same dose. Gastroesophageal reflux which is stable. She is on over-the-counter Prilosec with good results. She tolerates it well and will continue the same dose. Graph otherwise she is stable. We will continue as she is doing medication wise and see me back in 6 months with lab for her yearly exam.    Return in about 6 months (around 9/8/2021) for blood pressure check, diabetes check. Patient given educational materials- see patient instructions. Discussed use, benefit, and side effects of prescribedmedications. All patient questions answered. Pt voiced understanding. Reviewedhealth maintenance. Instructed to continue current medications, diet and exercise. Patient agreed with treatment plan. **This report has been created usingvoice recognition software. It may contain minor errors which are inherent in voicerecognition technology. **    Electronically signed by Saige Perez MD on 3/8/2021 at 3:23 PM

## 2021-05-06 ENCOUNTER — TELEPHONE (OUTPATIENT)
Dept: INTERNAL MEDICINE | Age: 46
End: 2021-05-06

## 2021-05-06 RX ORDER — INDOMETHACIN 50 MG/1
CAPSULE ORAL
Qty: 18 CAPSULE | Refills: 1 | Status: SHIPPED | OUTPATIENT
Start: 2021-05-06 | End: 2022-09-29 | Stop reason: ALTCHOICE

## 2021-05-25 ENCOUNTER — OFFICE VISIT (OUTPATIENT)
Dept: INTERNAL MEDICINE | Age: 46
End: 2021-05-25
Payer: COMMERCIAL

## 2021-05-25 VITALS
HEART RATE: 84 BPM | BODY MASS INDEX: 47.61 KG/M2 | OXYGEN SATURATION: 99 % | WEIGHT: 293 LBS | SYSTOLIC BLOOD PRESSURE: 110 MMHG | DIASTOLIC BLOOD PRESSURE: 70 MMHG

## 2021-05-25 DIAGNOSIS — G60.9 IDIOPATHIC PERIPHERAL NEUROPATHY: ICD-10-CM

## 2021-05-25 PROCEDURE — 99214 OFFICE O/P EST MOD 30 MIN: CPT | Performed by: INTERNAL MEDICINE

## 2021-05-25 RX ORDER — GABAPENTIN 100 MG/1
100 CAPSULE ORAL 3 TIMES DAILY
Qty: 90 CAPSULE | Refills: 0 | Status: SHIPPED | OUTPATIENT
Start: 2021-05-25 | End: 2021-06-24 | Stop reason: SDUPTHER

## 2021-05-25 SDOH — ECONOMIC STABILITY: FOOD INSECURITY: WITHIN THE PAST 12 MONTHS, YOU WORRIED THAT YOUR FOOD WOULD RUN OUT BEFORE YOU GOT MONEY TO BUY MORE.: NEVER TRUE

## 2021-05-25 SDOH — ECONOMIC STABILITY: FOOD INSECURITY: WITHIN THE PAST 12 MONTHS, THE FOOD YOU BOUGHT JUST DIDN'T LAST AND YOU DIDN'T HAVE MONEY TO GET MORE.: NEVER TRUE

## 2021-05-25 ASSESSMENT — PATIENT HEALTH QUESTIONNAIRE - PHQ9
SUM OF ALL RESPONSES TO PHQ QUESTIONS 1-9: 0
2. FEELING DOWN, DEPRESSED OR HOPELESS: 0
SUM OF ALL RESPONSES TO PHQ9 QUESTIONS 1 & 2: 0
1. LITTLE INTEREST OR PLEASURE IN DOING THINGS: 0

## 2021-05-25 ASSESSMENT — ENCOUNTER SYMPTOMS
EYE DISCHARGE: 0
SORE THROAT: 0
EYE ITCHING: 0
WHEEZING: 0
ABDOMINAL DISTENTION: 0
VOMITING: 0
BACK PAIN: 0
BLOOD IN STOOL: 0
SHORTNESS OF BREATH: 0
TROUBLE SWALLOWING: 0
SINUS PRESSURE: 0
ABDOMINAL PAIN: 0
NAUSEA: 0

## 2021-05-25 ASSESSMENT — SOCIAL DETERMINANTS OF HEALTH (SDOH): HOW HARD IS IT FOR YOU TO PAY FOR THE VERY BASICS LIKE FOOD, HOUSING, MEDICAL CARE, AND HEATING?: NOT HARD AT ALL

## 2021-05-25 NOTE — PROGRESS NOTES
[address] [facility]  [address] [address] [address] [place] [address]  Dept: [phone]  Dept Fax: [phone] 33: [phone]      Visit Date: 5/25/2021    Erinn Hernandez a 50 y.o. female who presents today for:  Chief Complaint   Patient presents with    Other     feet burning         HPI:     Complaints of feet burning is getting worse and has not gotten any better since last couple of visits when she was in. Say they burn and hurt all day long. Past Medical History:   Diagnosis Date    Allergic rhinitis     Asthma     Chronic pansinusitis 8/1/2017    Essential hypertension 11/17/2017    Gastroesophageal reflux disease without esophagitis 10/16/2018    Herpes zoster without complication 9/7/3953    Idiopathic peripheral neuropathy 5/25/2021    Mixed hyperlipidemia 8/30/2019    Osteoarthritis 10/16/2018    Simple chronic bronchitis ([address] 8/1/2017      Past Surgical History:   Procedure Laterality Date    HYSTERECTOMY      TONSILLECTOMY         No family history on file. Social History     Tobacco Use    Smoking status: Never Smoker    Smokeless tobacco: Never Used   Substance Use Topics    Alcohol use: No      Current Outpatient Medications   Medication Sig Dispense Refill    gabapentin (NEURONTIN) 100 MG capsule Take 1 capsule by mouth 3 times daily for 30 days.  Intended supply: 30 days 90 capsule 0    indomethacin (INDOCIN) 50 MG capsule TAKE 3 TIMES DAILY FOR 3 DAYS, 2 TIMES DAILY FOR 3 DAYS, 1 DAILY FOR 3 DAYS 18 capsule 1    EPIPEN 2-SERGIO 0.3 MG/0.3ML SOAJ injection prn 1 each 5    losartan-hydroCHLOROthiazide (HYZAAR) 100-25 MG per tablet TAKE (1) TABLET BY MOUTH EVERY DAY **NEW STRENGTH** 90 tablet 3    meloxicam (MOBIC) 15 MG tablet Once dly 90 tablet 3    omeprazole (PRILOSEC) 20 MG delayed release capsule Take 20 mg by mouth daily      fluticasone (FLONASE) 50 MCG/ACT nasal spray       levocetirizine (XYZAL) 5 MG tablet voiced understanding. Reviewedhealth maintenance. Instructed to continue current medications, diet and exercise. Patient agreed with treatment plan. **This report has been created usingvoice recognition software. It may contain minor errors which are inherent in voicerecognition technology. **    Electronically signed by Lennox Dingwall, MD on 5/25/2021 at 4:07 PM

## 2021-06-09 ENCOUNTER — HOSPITAL ENCOUNTER (OUTPATIENT)
Dept: NEUROLOGY | Age: 46
Discharge: HOME | End: 2021-06-09
Payer: COMMERCIAL

## 2021-06-09 PROCEDURE — 95886 MUSC TEST DONE W/N TEST COMP: CPT

## 2021-06-09 PROCEDURE — 95909 NRV CNDJ TST 5-6 STUDIES: CPT

## 2021-06-09 PROCEDURE — 95886 MUSC TEST DONE W/N TEST COMP: CPT | Performed by: PSYCHIATRY & NEUROLOGY

## 2021-06-09 PROCEDURE — 95909 NRV CNDJ TST 5-6 STUDIES: CPT | Performed by: PSYCHIATRY & NEUROLOGY

## 2021-06-24 ENCOUNTER — OFFICE VISIT (OUTPATIENT)
Dept: INTERNAL MEDICINE | Age: 46
End: 2021-06-24
Payer: COMMERCIAL

## 2021-06-24 VITALS
SYSTOLIC BLOOD PRESSURE: 116 MMHG | OXYGEN SATURATION: 97 % | DIASTOLIC BLOOD PRESSURE: 64 MMHG | HEIGHT: 66 IN | WEIGHT: 293 LBS | BODY MASS INDEX: 47.09 KG/M2 | HEART RATE: 88 BPM

## 2021-06-24 DIAGNOSIS — G60.9 IDIOPATHIC PERIPHERAL NEUROPATHY: ICD-10-CM

## 2021-06-24 PROCEDURE — 99214 OFFICE O/P EST MOD 30 MIN: CPT | Performed by: INTERNAL MEDICINE

## 2021-06-24 RX ORDER — GABAPENTIN 100 MG/1
100 CAPSULE ORAL 2 TIMES DAILY
Qty: 60 CAPSULE | Refills: 5 | Status: SHIPPED | OUTPATIENT
Start: 2021-06-24 | End: 2021-06-25 | Stop reason: SDUPTHER

## 2021-06-24 ASSESSMENT — ENCOUNTER SYMPTOMS
BLOOD IN STOOL: 0
BACK PAIN: 0
ABDOMINAL PAIN: 0
VOMITING: 0
SHORTNESS OF BREATH: 0
ABDOMINAL DISTENTION: 0
TROUBLE SWALLOWING: 0
SINUS PRESSURE: 0
NAUSEA: 0
WHEEZING: 0
SORE THROAT: 0
EYE DISCHARGE: 0
EYE ITCHING: 0

## 2021-06-24 NOTE — PROGRESS NOTES
[facility] PHYSIC[facility] SERVICES  [facility] MED[facility]  [address] [facility] [address] [place] [address]  Dept: [phone]  Dept Fax: [phone] 33: [phone]      Visit Date: 6/24/2021    Samuella Mohs a 50 y.o. female who presents today for:  Chief Complaint   Patient presents with    Other     4 week follow Keri Ruiz is helping         HPI:     Present for 4-week follow-up on her peripheral neuropathy. We started her on medication and she is feeling better. Past Medical History:   Diagnosis Date    Allergic rhinitis     Asthma     Chronic pansinusitis 8/1/2017    Essential hypertension 11/17/2017    Gastroesophageal reflux disease without esophagitis 10/16/2018    Herpes zoster without complication 3/2/1065    Idiopathic peripheral neuropathy 5/25/2021    Mixed hyperlipidemia 8/30/2019    Osteoarthritis 10/16/2018    Simple chronic bronchitis ([address] 8/1/2017      Past Surgical History:   Procedure Laterality Date    HYSTERECTOMY      TONSILLECTOMY         No family history on file. Social History     Tobacco Use    Smoking status: Never Smoker    Smokeless tobacco: Never Used   Substance Use Topics    Alcohol use: No      Current Outpatient Medications   Medication Sig Dispense Refill    gabapentin (NEURONTIN) 100 MG capsule Take 1 capsule by mouth 2 times daily for 30 days.  Intended supply: 30 days 60 capsule 5    indomethacin (INDOCIN) 50 MG capsule TAKE 3 TIMES DAILY FOR 3 DAYS, 2 TIMES DAILY FOR 3 DAYS, 1 DAILY FOR 3 DAYS 18 capsule 1    EPIPEN 2-SERGIO 0.3 MG/0.3ML SOAJ injection prn 1 each 5    losartan-hydroCHLOROthiazide (HYZAAR) 100-25 MG per tablet TAKE (1) TABLET BY MOUTH EVERY DAY **NEW STRENGTH** 90 tablet 3    meloxicam (MOBIC) 15 MG tablet Once dly 90 tablet 3    omeprazole (PRILOSEC) 20 MG delayed release capsule Take 20 mg by mouth daily      fluticasone (FLONASE) 50 MCG/ACT nasal spray       levocetirizine (XYZAL) 5 MG tablet       PROAIR  (90 Base) MCG/ACT inhaler        No current facility-administered medications for this visit. No Known Allergies      Subjective:     Review of Systems   Constitutional: Negative for activity change, appetite change, fatigue and fever. HENT: Negative for congestion, hearing loss, sinus pressure, sore throat and trouble swallowing. Eyes: Negative for discharge and itching. Respiratory: Negative for shortness of breath and wheezing. Cardiovascular: Negative for chest pain, palpitations and leg swelling. Gastrointestinal: Negative for abdominal distention, abdominal pain, blood in stool, nausea and vomiting. Endocrine: Negative for cold intolerance, heat intolerance and polydipsia. Genitourinary: Negative for flank pain, frequency, hematuria and urgency. Musculoskeletal: Negative for arthralgias, back pain and joint swelling. Skin: Negative for rash and wound. Allergic/Immunologic: Negative for environmental allergies and food allergies. Neurological: Negative for dizziness, tremors, syncope, weakness, numbness and headaches. Hematological: Negative for adenopathy. Psychiatric/Behavioral: Negative for agitation and hallucinations. The patient is not nervous/anxious. Objective:      /64 (Site: Left Upper Arm, Position: Sitting, Cuff Size: Large Adult)   Pulse 88   Ht 5' 6\" (1.676 m)   Wt (!) 301 lb 12.8 oz (136.9 kg)   SpO2 97%   BMI 48.71 kg/m²    Physical Exam  Constitutional:       General: She is not in acute distress. Appearance: She is well-developed. She is not diaphoretic. HENT:      Head: Normocephalic and atraumatic. Right Ear: External ear normal.      Left Ear: External ear normal.      Nose: Nose normal.      Mouth/Throat:      Pharynx: No oropharyngeal exudate. Eyes:      General: No scleral icterus. Right eye: No discharge. Left eye: No discharge.       Conjunctiva/sclera: Conjunctivae normal.      Pupils: Pupils are equal, materials- see patient instructions. Discussed use, benefit, and side effects of prescribedmedications. All patient questions answered. Pt voiced understanding. Reviewedhealth maintenance. Instructed to continue current medications, diet and exercise. Patient agreed with treatment plan. **This report has been created usingvoice recognition software. It may contain minor errors which are inherent in voicerecognition technology. **    Electronically signed by Nati Kyle MD on 6/24/2021 at 2:48 PM

## 2021-06-25 ENCOUNTER — TELEPHONE (OUTPATIENT)
Dept: INTERNAL MEDICINE | Age: 46
End: 2021-06-25

## 2021-06-25 DIAGNOSIS — G60.9 IDIOPATHIC PERIPHERAL NEUROPATHY: ICD-10-CM

## 2021-06-25 RX ORDER — GABAPENTIN 100 MG/1
100 CAPSULE ORAL DAILY
Qty: 30 CAPSULE | Refills: 5 | Status: CANCELLED | OUTPATIENT
Start: 2021-06-25 | End: 2021-12-22

## 2021-06-25 RX ORDER — GABAPENTIN 100 MG/1
100 CAPSULE ORAL DAILY
Qty: 30 CAPSULE | Refills: 5 | Status: SHIPPED | OUTPATIENT
Start: 2021-06-25 | End: 2021-09-23 | Stop reason: SDUPTHER

## 2021-06-25 RX ORDER — GABAPENTIN 100 MG/1
100 CAPSULE ORAL DAILY
Qty: 30 CAPSULE | Refills: 5 | Status: SHIPPED
Start: 2021-06-25 | End: 2021-06-25 | Stop reason: CLARIF

## 2021-06-25 NOTE — TELEPHONE ENCOUNTER
Patient called stating her Gabapentin was written for 1 tablet 2 times daily and was suppose to be 1 tablet 3 times daily. She picked up the script but was wanting a new Rx sent in for the other 30 tablets.  Please advise

## 2021-07-09 DIAGNOSIS — M26.609 JOINT DISEASE, TEMPOROMANDIBULAR: ICD-10-CM

## 2021-07-09 RX ORDER — MELOXICAM 15 MG/1
TABLET ORAL
Qty: 90 TABLET | Refills: 0 | Status: SHIPPED | OUTPATIENT
Start: 2021-07-09 | End: 2021-09-27 | Stop reason: SDUPTHER

## 2021-07-09 RX ORDER — LOSARTAN POTASSIUM AND HYDROCHLOROTHIAZIDE 25; 100 MG/1; MG/1
TABLET ORAL
Qty: 90 TABLET | Refills: 0 | Status: SHIPPED | OUTPATIENT
Start: 2021-07-09 | End: 2021-09-27 | Stop reason: SDUPTHER

## 2021-07-09 NOTE — TELEPHONE ENCOUNTER
Last Appointment Date: 6/24/2021  Next Appointment Date: 9/9/2021    No Known Allergies    Patient needs refill on   Requested Prescriptions     Pending Prescriptions Disp Refills    losartan-hydroCHLOROthiazide (HYZAAR) 100-25 MG per tablet 90 tablet 3     Sig: TAKE (1) TABLET BY MOUTH EVERY DAY **NEW STRENGTH**    meloxicam (MOBIC) 15 MG tablet 90 tablet 3     Sig: Once dly

## 2021-09-08 ENCOUNTER — TELEPHONE (OUTPATIENT)
Dept: INTERNAL MEDICINE | Age: 46
End: 2021-09-08

## 2021-09-08 NOTE — TELEPHONE ENCOUNTER
----- Message from Tra Pae sent at 9/7/2021  9:38 AM CDT -----  Subject: Message to Provider    QUESTIONS  Information for Provider? Pt would like to know when she should get her   blood work done for her appt on 9.23.21  ---------------------------------------------------------------------------  --------------  [address]  What is the best way for the office to contact you? OK to leave message on   voicemail  Preferred Call Back Phone Number? [phone]  ---------------------------------------------------------------------------  --------------  SCRIPT ANSWERS  Relationship to Patient?  Self

## 2021-09-23 ENCOUNTER — OFFICE VISIT (OUTPATIENT)
Dept: INTERNAL MEDICINE | Age: 46
End: 2021-09-23
Payer: COMMERCIAL

## 2021-09-23 VITALS
WEIGHT: 293 LBS | RESPIRATION RATE: 16 BRPM | HEART RATE: 88 BPM | SYSTOLIC BLOOD PRESSURE: 117 MMHG | BODY MASS INDEX: 47.09 KG/M2 | HEIGHT: 66 IN | OXYGEN SATURATION: 98 % | DIASTOLIC BLOOD PRESSURE: 70 MMHG

## 2021-09-23 DIAGNOSIS — G60.9 IDIOPATHIC PERIPHERAL NEUROPATHY: ICD-10-CM

## 2021-09-23 DIAGNOSIS — I10 ESSENTIAL HYPERTENSION: Primary | ICD-10-CM

## 2021-09-23 DIAGNOSIS — E78.2 MIXED HYPERLIPIDEMIA: ICD-10-CM

## 2021-09-23 DIAGNOSIS — H05.20 PROPTOSIS: ICD-10-CM

## 2021-09-23 LAB
T4 TOTAL: 6.4 UG/DL (ref 4.5–11.7)
TSH SERPL DL<=0.05 MIU/L-ACNC: 2.2 UIU/ML (ref 0.27–4.2)

## 2021-09-23 PROCEDURE — 90686 IIV4 VACC NO PRSV 0.5 ML IM: CPT | Performed by: INTERNAL MEDICINE

## 2021-09-23 PROCEDURE — 90471 IMMUNIZATION ADMIN: CPT | Performed by: INTERNAL MEDICINE

## 2021-09-23 PROCEDURE — 99214 OFFICE O/P EST MOD 30 MIN: CPT | Performed by: INTERNAL MEDICINE

## 2021-09-23 ASSESSMENT — ENCOUNTER SYMPTOMS
NAUSEA: 0
TROUBLE SWALLOWING: 0
WHEEZING: 0
SINUS PRESSURE: 0
ABDOMINAL PAIN: 0
ABDOMINAL DISTENTION: 0
BLOOD IN STOOL: 0
BACK PAIN: 0
VOMITING: 0
EYE ITCHING: 0
EYE DISCHARGE: 0
SHORTNESS OF BREATH: 0
SORE THROAT: 0

## 2021-09-23 NOTE — PROGRESS NOTES
[address] [facility]  [address] [facility] [address] [address]  Dept: [phone]  Dept Fax: [phone]: [phone]      Visit Date: 9/23/2021    Hadley Jiang a 48 y.o. female who presents today for:  Chief Complaint   Patient presents with    Annual Exam     Patient wants a referral to a new OB, Dr. Tran Najera wants to check for Thyroid due to petruding eye's         HPI:     There present for her annual exam.  She has hypertension as well as arthritis and gastric reflux. She saw the eye doctor today and he thought she had proptosis and needs to have her thyroid checked. She had lab for review. Past Medical History:   Diagnosis Date    Allergic rhinitis     Asthma     Chronic pansinusitis 8/1/2017    Essential hypertension 11/17/2017    Gastroesophageal reflux disease without esophagitis 10/16/2018    Herpes zoster without complication 6/6/0703    Idiopathic peripheral neuropathy 5/25/2021    Mixed hyperlipidemia 8/30/2019    Osteoarthritis 10/16/2018    Simple chronic bronchitis ([address] 8/1/2017      Past Surgical History:   Procedure Laterality Date    HYSTERECTOMY      TONSILLECTOMY         No family history on file. Social History     Tobacco Use    Smoking status: Never Smoker    Smokeless tobacco: Never Used   Substance Use Topics    Alcohol use: No      Current Outpatient Medications   Medication Sig Dispense Refill    losartan-hydroCHLOROthiazide (HYZAAR) 100-25 MG per tablet TAKE (1) TABLET BY MOUTH EVERY DAY **NEW STRENGTH** 90 tablet 0    meloxicam (MOBIC) 15 MG tablet Once dly 90 tablet 0    gabapentin (NEURONTIN) 100 MG capsule Take 1 capsule by mouth daily for 180 days.  30 capsule 5    indomethacin (INDOCIN) 50 MG capsule TAKE 3 TIMES DAILY FOR 3 DAYS, 2 TIMES DAILY FOR 3 DAYS, 1 DAILY FOR 3 DAYS 18 capsule 1    EPIPEN 2-SERGIO 0.3 MG/0.3ML SOAJ injection prn 1 each 5    omeprazole (PRILOSEC) 20 MG delayed release capsule Take 20 mg by mouth daily      fluticasone (FLONASE) 50 MCG/ACT nasal spray       levocetirizine (XYZAL) 5 MG tablet       PROAIR  (90 Base) MCG/ACT inhaler        No current facility-administered medications for this visit. No Known Allergies      Subjective:     Review of Systems   Constitutional: Negative for activity change, appetite change, fatigue and fever. HENT: Negative for congestion, hearing loss, sinus pressure, sore throat and trouble swallowing. Eyes: Negative for discharge and itching. Bulging eyes   Respiratory: Negative for shortness of breath and wheezing. Cardiovascular: Negative for chest pain, palpitations and leg swelling. Gastrointestinal: Negative for abdominal distention, abdominal pain, blood in stool, nausea and vomiting. Endocrine: Negative for cold intolerance, heat intolerance and polydipsia. Genitourinary: Negative for flank pain, frequency, hematuria and urgency. Musculoskeletal: Negative for arthralgias, back pain and joint swelling. Skin: Negative for rash and wound. Allergic/Immunologic: Negative for environmental allergies and food allergies. Neurological: Negative for dizziness, tremors, syncope, weakness, numbness and headaches. Hematological: Negative for adenopathy. Psychiatric/Behavioral: Negative for agitation and hallucinations. The patient is not nervous/anxious. Objective:      /70   Pulse 88   Resp 16   Ht 5' 6\" (1.676 m)   Wt 299 lb (135.6 kg)   SpO2 98%   BMI 48.26 kg/m²    Physical Exam  Constitutional:       General: She is not in acute distress. Appearance: She is well-developed. She is not diaphoretic. HENT:      Head: Normocephalic and atraumatic. Right Ear: External ear normal.      Left Ear: External ear normal.      Nose: Nose normal.      Mouth/Throat:      Pharynx: No oropharyngeal exudate. Eyes:      General: No scleral icterus. Right eye: No discharge.          Left eye: No discharge. Conjunctiva/sclera: Conjunctivae normal.      Pupils: Pupils are equal, round, and reactive to light. Comments: Bilateral proptosis   Neck:      Thyroid: No thyromegaly. Vascular: No JVD. Trachea: No tracheal deviation. Cardiovascular:      Rate and Rhythm: Normal rate and regular rhythm. Heart sounds: Normal heart sounds. No murmur heard. No friction rub. No gallop. Pulmonary:      Effort: Pulmonary effort is normal. No respiratory distress. Breath sounds: Normal breath sounds. No wheezing or rales. Abdominal:      General: Bowel sounds are normal. There is no distension. Palpations: Abdomen is soft. There is no mass. Tenderness: There is no abdominal tenderness. There is no guarding or rebound. Musculoskeletal:         General: No tenderness or deformity. Normal range of motion. Cervical back: Normal range of motion and neck supple. Lymphadenopathy:      Cervical: No cervical adenopathy. Skin:     General: Skin is warm and dry. Coloration: Skin is not pale. Findings: No erythema or rash. Neurological:      Mental Status: She is alert and oriented to person, place, and time. Cranial Nerves: No cranial nerve deficit. Motor: No abnormal muscle tone. Coordination: Coordination normal.      Deep Tendon Reflexes: Reflexes are normal and symmetric. Reflexes normal.   Psychiatric:         Behavior: Behavior normal.         Thought Content: Thought content normal.         Judgment: Judgment normal.          Assessment:      Diagnosis Orders   1. Essential hypertension     2. Idiopathic peripheral neuropathy  gabapentin (NEURONTIN) 100 MG capsule   3. Mixed hyperlipidemia     4. Proptosis              Plan:     Essential hypertension well-controlled. Blood pressure today is 117/70. She is on losartan/HCTZ 100/25 mg daily. She tolerates it well and will continue the same dose.     Peripheral neuropathy which is stable on her gabapentin 100 mg dose. She takes it regularly with good results. Hyperlipidemia. Her LDL is down her total cholesterol is down her triglycerides are down. She is not on a statin. She tries to exercise just watch her diet as closely as possible. Proptosis. This is a new finding ophthalmologist found this morning she does appear to have some. I am going to add a T4 to TSH to the labs she had drawn 2 days ago. Of incidences is noticed that her 49-year-old son also is on PTU for hyperthyroidism as well. She otherwise is stable. We will review the thyroid panel once available. See her back again in 6 months with lab. No follow-ups on file. Patient given educational materials- see patient instructions. Discussed use, benefit, and side effects of prescribedmedications. All patient questions answered. Pt voiced understanding. Reviewedhealth maintenance. Instructed to continue current medications, diet and exercise. Patient agreed with treatment plan. **This report has been created usingvoice recognition software. It may contain minor errors which are inherent in voicerecognition technology. **    Electronically signed by Norberto Clinton MD on 9/23/2021 at 4:25 PM

## 2021-09-27 DIAGNOSIS — M26.609 JOINT DISEASE, TEMPOROMANDIBULAR: ICD-10-CM

## 2021-09-27 RX ORDER — LOSARTAN POTASSIUM AND HYDROCHLOROTHIAZIDE 25; 100 MG/1; MG/1
TABLET ORAL
Qty: 90 TABLET | Refills: 1 | Status: SHIPPED | OUTPATIENT
Start: 2021-09-27 | End: 2022-03-23 | Stop reason: SDUPTHER

## 2021-09-27 RX ORDER — MELOXICAM 15 MG/1
TABLET ORAL
Qty: 90 TABLET | Refills: 1 | Status: SHIPPED | OUTPATIENT
Start: 2021-09-27 | End: 2022-03-23 | Stop reason: SDUPTHER

## 2021-09-27 NOTE — TELEPHONE ENCOUNTER
Dru Keene called to request a refill on her medication.       Last office visit : 9/23/2021   Next office visit : 3/22/2022     Requested Prescriptions     Pending Prescriptions Disp Refills    losartan-hydroCHLOROthiazide (HYZAAR) 100-25 MG per tablet 90 tablet 1     Sig: TAKE (1) TABLET BY MOUTH EVERY DAY **NEW STRENGTH**    meloxicam (MOBIC) 15 MG tablet 90 tablet 1     Sig: Once jackie Sales MA

## 2021-10-01 RX ORDER — GABAPENTIN 100 MG/1
100 CAPSULE ORAL 3 TIMES DAILY
Qty: 30 CAPSULE | Refills: 5 | Status: SHIPPED | OUTPATIENT
Start: 2021-10-01 | End: 2021-10-21 | Stop reason: SDUPTHER

## 2021-10-21 DIAGNOSIS — G60.9 IDIOPATHIC PERIPHERAL NEUROPATHY: ICD-10-CM

## 2021-10-21 RX ORDER — GABAPENTIN 100 MG/1
100 CAPSULE ORAL 3 TIMES DAILY
Qty: 90 CAPSULE | Refills: 0 | Status: SHIPPED | OUTPATIENT
Start: 2021-10-21 | End: 2021-11-16 | Stop reason: SDUPTHER

## 2021-10-21 NOTE — TELEPHONE ENCOUNTER
Nafisa Paige called to request a refill on her medication. Last office visit : 9/23/2021   Next office visit : 3/22/2022     Last UDS: No results found for: Terrace Kobs, LABBENZ, BUPRENUR, COCAIMETSCRU, GABAPENTIN, MDMA, METAMPU, OPIATESCREENURINE, OXTCOSU, PHENCYCLIDINESCREENURINE, PROPOXYPHENE, THCSCREENUR, TRICYUR    Last Henna Mort: 10/21/21   Medication Contract:    Last Fill: 9/27/21 but only 60    Requested Prescriptions     Pending Prescriptions Disp Refills    gabapentin (NEURONTIN) 100 MG capsule 90 capsule 0     Sig: Take 1 capsule by mouth 3 times daily for 30 days. Please approve or refuse this medication.    Karen Terrell MA

## 2021-11-16 DIAGNOSIS — G60.9 IDIOPATHIC PERIPHERAL NEUROPATHY: ICD-10-CM

## 2021-11-16 RX ORDER — GABAPENTIN 100 MG/1
100 CAPSULE ORAL 3 TIMES DAILY
Qty: 90 CAPSULE | Refills: 0 | Status: SHIPPED | OUTPATIENT
Start: 2021-11-16 | End: 2021-12-16 | Stop reason: SDUPTHER

## 2021-11-16 NOTE — TELEPHONE ENCOUNTER
Belia Dumont called to request a refill on her medication. Last office visit : 9/23/2021   Next office visit : 3/22/2022     Last UDS: No results found for: Rosi Phelps, LABBENZ, BUPRENUR, COCAIMETSCRU, GABAPENTIN, MDMA, METAMPU, OPIATESCREENURINE, OXTCOSU, PHENCYCLIDINESCREENURINE, PROPOXYPHENE, THCSCREENUR, TRICYUR    Last Rito: 10/21/2021  Medication Contract:     Requested Prescriptions     Pending Prescriptions Disp Refills    gabapentin (NEURONTIN) 100 MG capsule 90 capsule 0     Sig: Take 1 capsule by mouth 3 times daily for 30 days. Please approve or refuse this medication.    Raul Storey

## 2021-12-16 DIAGNOSIS — G60.9 IDIOPATHIC PERIPHERAL NEUROPATHY: ICD-10-CM

## 2021-12-17 NOTE — TELEPHONE ENCOUNTER
Blanchie Masson called requesting a refill of the below medication which has been pended for you:     Requested Prescriptions     Pending Prescriptions Disp Refills    gabapentin (NEURONTIN) 100 MG capsule 90 capsule 0     Sig: Take 1 capsule by mouth 3 times daily for 30 days.        Last Appointment Date: 9/23/2021  Next Appointment Date: 3/22/2022    No Known Allergies

## 2021-12-20 RX ORDER — GABAPENTIN 100 MG/1
100 CAPSULE ORAL 3 TIMES DAILY
Qty: 90 CAPSULE | Refills: 0 | Status: SHIPPED | OUTPATIENT
Start: 2021-12-20 | End: 2022-01-17 | Stop reason: SDUPTHER

## 2022-01-17 DIAGNOSIS — G60.9 IDIOPATHIC PERIPHERAL NEUROPATHY: ICD-10-CM

## 2022-01-17 RX ORDER — GABAPENTIN 100 MG/1
100 CAPSULE ORAL 3 TIMES DAILY
Qty: 90 CAPSULE | Refills: 0 | Status: SHIPPED | OUTPATIENT
Start: 2022-01-17 | End: 2022-03-23 | Stop reason: SDUPTHER

## 2022-01-17 NOTE — TELEPHONE ENCOUNTER
Nadia Shoshana called to request a refill on her medication. Last office visit : 9/23/2021   Next office visit : 3/22/2022     Last UDS: No results found for: Anahi Castillo, LABBENZ, BUPRENUR, COCAIMETSCRU, GABAPENTIN, MDMA, METAMPU, OPIATESCREENURINE, OXTCOSU, PHENCYCLIDINESCREENURINE, PROPOXYPHENE, THCSCREENUR, TRICYUR    Last Rito: 1/17/22   Last Fill: 12/20/21    Requested Prescriptions     Pending Prescriptions Disp Refills    gabapentin (NEURONTIN) 100 MG capsule 90 capsule 0     Sig: Take 1 capsule by mouth 3 times daily for 30 days. Please approve or refuse this medication.    Geovanna Mustafa

## 2022-03-17 DIAGNOSIS — Z00.00 ROUTINE GENERAL MEDICAL EXAMINATION AT A HEALTH CARE FACILITY: Primary | ICD-10-CM

## 2022-03-17 DIAGNOSIS — Z00.00 ROUTINE GENERAL MEDICAL EXAMINATION AT A HEALTH CARE FACILITY: ICD-10-CM

## 2022-03-17 LAB
ALBUMIN SERPL-MCNC: 4.5 G/DL (ref 3.5–5.2)
ALP BLD-CCNC: 84 U/L (ref 35–104)
ALT SERPL-CCNC: 28 U/L (ref 5–33)
ANION GAP SERPL CALCULATED.3IONS-SCNC: 14 MMOL/L (ref 7–19)
AST SERPL-CCNC: 22 U/L (ref 5–32)
BASOPHILS ABSOLUTE: 0 K/UL (ref 0–0.2)
BASOPHILS RELATIVE PERCENT: 0.5 % (ref 0–1)
BILIRUB SERPL-MCNC: 0.4 MG/DL (ref 0.2–1.2)
BUN BLDV-MCNC: 26 MG/DL (ref 6–20)
CALCIUM SERPL-MCNC: 10.2 MG/DL (ref 8.6–10)
CHLORIDE BLD-SCNC: 100 MMOL/L (ref 98–111)
CHOLESTEROL, FASTING: 185 MG/DL (ref 160–199)
CO2: 23 MMOL/L (ref 22–29)
CREAT SERPL-MCNC: 0.7 MG/DL (ref 0.5–0.9)
EOSINOPHILS ABSOLUTE: 0.1 K/UL (ref 0–0.6)
EOSINOPHILS RELATIVE PERCENT: 2.3 % (ref 0–5)
GFR AFRICAN AMERICAN: >59
GFR NON-AFRICAN AMERICAN: >60
GLUCOSE BLD-MCNC: 125 MG/DL (ref 74–109)
HCT VFR BLD CALC: 42.9 % (ref 37–47)
HDLC SERPL-MCNC: 34 MG/DL (ref 65–121)
HEMOGLOBIN: 13.6 G/DL (ref 12–16)
IMMATURE GRANULOCYTES #: 0 K/UL
LDL CHOLESTEROL CALCULATED: 122 MG/DL
LYMPHOCYTES ABSOLUTE: 1.6 K/UL (ref 1.1–4.5)
LYMPHOCYTES RELATIVE PERCENT: 27.9 % (ref 20–40)
MCH RBC QN AUTO: 27.9 PG (ref 27–31)
MCHC RBC AUTO-ENTMCNC: 31.7 G/DL (ref 33–37)
MCV RBC AUTO: 87.9 FL (ref 81–99)
MONOCYTES ABSOLUTE: 0.5 K/UL (ref 0–0.9)
MONOCYTES RELATIVE PERCENT: 9.4 % (ref 0–10)
NEUTROPHILS ABSOLUTE: 3.4 K/UL (ref 1.5–7.5)
NEUTROPHILS RELATIVE PERCENT: 59.5 % (ref 50–65)
PDW BLD-RTO: 13.1 % (ref 11.5–14.5)
PLATELET # BLD: 276 K/UL (ref 130–400)
PMV BLD AUTO: 12 FL (ref 9.4–12.3)
POTASSIUM SERPL-SCNC: 4.1 MMOL/L (ref 3.5–5)
RBC # BLD: 4.88 M/UL (ref 4.2–5.4)
SODIUM BLD-SCNC: 137 MMOL/L (ref 136–145)
TOTAL PROTEIN: 7.4 G/DL (ref 6.6–8.7)
TRIGLYCERIDE, FASTING: 143 MG/DL (ref 0–149)
TSH SERPL DL<=0.05 MIU/L-ACNC: 1.9 UIU/ML (ref 0.27–4.2)
WBC # BLD: 5.7 K/UL (ref 4.8–10.8)

## 2022-03-22 ENCOUNTER — OFFICE VISIT (OUTPATIENT)
Dept: INTERNAL MEDICINE | Age: 47
End: 2022-03-22
Payer: COMMERCIAL

## 2022-03-22 VITALS
BODY MASS INDEX: 45.64 KG/M2 | SYSTOLIC BLOOD PRESSURE: 118 MMHG | RESPIRATION RATE: 16 BRPM | HEIGHT: 66 IN | HEART RATE: 81 BPM | OXYGEN SATURATION: 100 % | WEIGHT: 284 LBS | DIASTOLIC BLOOD PRESSURE: 72 MMHG

## 2022-03-22 DIAGNOSIS — E78.2 MIXED HYPERLIPIDEMIA: ICD-10-CM

## 2022-03-22 DIAGNOSIS — R10.31 RIGHT INGUINAL PAIN: ICD-10-CM

## 2022-03-22 DIAGNOSIS — I10 ESSENTIAL HYPERTENSION: ICD-10-CM

## 2022-03-22 DIAGNOSIS — R73.09 ELEVATED GLUCOSE: Primary | ICD-10-CM

## 2022-03-22 PROCEDURE — 99214 OFFICE O/P EST MOD 30 MIN: CPT | Performed by: INTERNAL MEDICINE

## 2022-03-22 ASSESSMENT — ENCOUNTER SYMPTOMS
BLOOD IN STOOL: 0
WHEEZING: 0
BACK PAIN: 0
VOMITING: 0
SHORTNESS OF BREATH: 0
ABDOMINAL PAIN: 0
ABDOMINAL DISTENTION: 0
EYE ITCHING: 0
SORE THROAT: 0
NAUSEA: 0
SINUS PRESSURE: 0
TROUBLE SWALLOWING: 0
EYE DISCHARGE: 0

## 2022-03-22 NOTE — PROGRESS NOTES
[address] [facility]  [address] [address]  [address] [address]  Dept: [phone]  Dept Fax: [phone]: [phone]      Visit Date: 3/22/2022    Omid Oden a 48 y.o. female who presents today for:  Chief Complaint   Patient presents with    6 Month Follow-Up     Patient reports right sided groin pain. HPI:     Salina is in for 6-month follow-up visit. She doing okay other than she is got some right-sided groin pain is been going on since November. Past Medical History:   Diagnosis Date    Allergic rhinitis     Asthma     Chronic pansinusitis 8/1/2017    Essential hypertension 11/17/2017    Gastroesophageal reflux disease without esophagitis 10/16/2018    Herpes zoster without complication 3/6/8905    Idiopathic peripheral neuropathy 5/25/2021    Mixed hyperlipidemia 8/30/2019    Osteoarthritis 10/16/2018    Simple chronic bronchitis ([address] 8/1/2017      Past Surgical History:   Procedure Laterality Date    HYSTERECTOMY      TONSILLECTOMY         No family history on file. Social History     Tobacco Use    Smoking status: Never Smoker    Smokeless tobacco: Never Used   Substance Use Topics    Alcohol use: No      Current Outpatient Medications   Medication Sig Dispense Refill    gabapentin (NEURONTIN) 100 MG capsule Take 1 capsule by mouth 3 times daily for 30 days.  90 capsule 0    losartan-hydroCHLOROthiazide (HYZAAR) 100-25 MG per tablet TAKE (1) TABLET BY MOUTH EVERY DAY **NEW STRENGTH** 90 tablet 1    meloxicam (MOBIC) 15 MG tablet Once dly 90 tablet 1    indomethacin (INDOCIN) 50 MG capsule TAKE 3 TIMES DAILY FOR 3 DAYS, 2 TIMES DAILY FOR 3 DAYS, 1 DAILY FOR 3 DAYS 18 capsule 1    EPIPEN 2-SERGIO 0.3 MG/0.3ML SOAJ injection prn 1 each 5    omeprazole (PRILOSEC) 20 MG delayed release capsule Take 20 mg by mouth daily      fluticasone (FLONASE) 50 MCG/ACT nasal spray       levocetirizine (XYZAL) 5 MG tablet  PROAIR  (90 Base) MCG/ACT inhaler        No current facility-administered medications for this visit. No Known Allergies      Subjective:     Review of Systems   Constitutional: Negative for activity change, appetite change, fatigue and fever. HENT: Negative for congestion, hearing loss, sinus pressure, sore throat and trouble swallowing. Eyes: Negative for discharge and itching. Respiratory: Negative for shortness of breath and wheezing. Cardiovascular: Negative for chest pain, palpitations and leg swelling. Gastrointestinal: Negative for abdominal distention, abdominal pain, blood in stool, nausea and vomiting. Endocrine: Negative for cold intolerance, heat intolerance and polydipsia. Genitourinary: Negative for flank pain, frequency, hematuria and urgency. Musculoskeletal: Negative for arthralgias, back pain and joint swelling. Right groin pain x4 months   Skin: Negative for rash and wound. Allergic/Immunologic: Negative for environmental allergies and food allergies. Neurological: Negative for dizziness, tremors, syncope, weakness, numbness and headaches. Hematological: Negative for adenopathy. Psychiatric/Behavioral: Negative for agitation and hallucinations. The patient is not nervous/anxious. Objective:      /72   Pulse 81   Resp 16   Ht 5' 6\" (1.676 m)   Wt 284 lb (128.8 kg)   SpO2 100%   BMI 45.84 kg/m²    Physical Exam  Constitutional:       General: She is not in acute distress. Appearance: She is well-developed. She is not diaphoretic. HENT:      Head: Normocephalic and atraumatic. Right Ear: External ear normal.      Left Ear: External ear normal.      Nose: Nose normal.      Mouth/Throat:      Pharynx: No oropharyngeal exudate. Eyes:      General: No scleral icterus. Right eye: No discharge. Left eye: No discharge.       Conjunctiva/sclera: Conjunctivae normal.      Pupils: Pupils are equal, round, and reactive to light. Neck:      Thyroid: No thyromegaly. Vascular: No JVD. Trachea: No tracheal deviation. Cardiovascular:      Rate and Rhythm: Normal rate and regular rhythm. Heart sounds: Normal heart sounds. No murmur heard. No friction rub. No gallop. Pulmonary:      Effort: Pulmonary effort is normal. No respiratory distress. Breath sounds: Normal breath sounds. No wheezing or rales. Abdominal:      General: Bowel sounds are normal. There is no distension. Palpations: Abdomen is soft. There is no mass. Tenderness: There is no abdominal tenderness. There is no guarding or rebound. Musculoskeletal:         General: No tenderness or deformity. Normal range of motion. Cervical back: Normal range of motion and neck supple. Lymphadenopathy:      Cervical: No cervical adenopathy. Skin:     General: Skin is warm and dry. Coloration: Skin is not pale. Findings: No erythema or rash. Neurological:      Mental Status: She is alert and oriented to person, place, and time. Cranial Nerves: No cranial nerve deficit. Motor: No abnormal muscle tone. Coordination: Coordination normal.      Deep Tendon Reflexes: Reflexes are normal and symmetric. Reflexes normal.   Psychiatric:         Behavior: Behavior normal.         Thought Content: Thought content normal.         Judgment: Judgment normal.          Assessment:      Diagnosis Orders   1. Elevated glucose  CBC with Auto Differential    Comprehensive Metabolic Panel    Hemoglobin A1C    Lipid Panel    T4, Free    TSH   2. Mixed hyperlipidemia  CBC with Auto Differential    Comprehensive Metabolic Panel    Hemoglobin A1C    Lipid Panel    T4, Free    TSH   3. Essential hypertension  CBC with Auto Differential    Comprehensive Metabolic Panel    Hemoglobin A1C    Lipid Panel    T4, Free    TSH   4.  Right inguinal pain  CBC with Auto Differential    Comprehensive Metabolic Panel    Hemoglobin A1C    Lipid Panel    T4, Free    TSH            Plan:     Glucose. She is doing well there. She has been watching her diet and her blood sugar is 125. She will continue with dietary compliance. Hyperlipidemia. She is doing fine there. Her LDLs down her total cholesterol is down her triglycerides are down's. She is doing it with diet and exercise. Essential hypertension. She is well controlled blood pressure 118/72. She is on losartan 100/25. She will continue the same dose. Right inguinal pain. She feels like she got a groin pull. She is been taken meloxicam and it hurts when she tries to lift her leg up in the groin itself not the hip and does not have any sciatic type pain associated with it. I told her we should try her on some physical therapy for a pulled groin to see if that will help. Otherwise she is stable. Will stay on the same medicines and see me in 6 months. Return in about 6 months (around 9/22/2022) for LAB 1 Abel Vazquez. Patient given educational materials- see patient instructions. Discussed use, benefit, and side effects of prescribedmedications. All patient questions answered. Pt voiced understanding. Reviewedhealth maintenance. Instructed to continue current medications, diet and exercise. Patient agreed with treatment plan. **This report has been created usingvoice recognition software. It may contain minor errors which are inherent in voicerecognition technology. **    Electronically signed by Reena Rdz MD on 3/22/2022 at 4:01 PM

## 2022-03-23 DIAGNOSIS — G60.9 IDIOPATHIC PERIPHERAL NEUROPATHY: ICD-10-CM

## 2022-03-23 DIAGNOSIS — M26.609 JOINT DISEASE, TEMPOROMANDIBULAR: ICD-10-CM

## 2022-03-24 RX ORDER — MELOXICAM 15 MG/1
TABLET ORAL
Qty: 90 TABLET | Refills: 1 | Status: SHIPPED | OUTPATIENT
Start: 2022-03-24 | End: 2022-05-17

## 2022-03-24 RX ORDER — GABAPENTIN 100 MG/1
100 CAPSULE ORAL 3 TIMES DAILY
Qty: 90 CAPSULE | Refills: 0 | Status: SHIPPED | OUTPATIENT
Start: 2022-03-24 | End: 2022-04-22 | Stop reason: SDUPTHER

## 2022-03-24 RX ORDER — LOSARTAN POTASSIUM AND HYDROCHLOROTHIAZIDE 25; 100 MG/1; MG/1
TABLET ORAL
Qty: 90 TABLET | Refills: 1 | Status: SHIPPED | OUTPATIENT
Start: 2022-03-24 | End: 2022-05-17

## 2022-03-24 NOTE — TELEPHONE ENCOUNTER
Estrada Ford called requesting a refill of the below medication which has been pended for you:     Requested Prescriptions     Pending Prescriptions Disp Refills    losartan-hydroCHLOROthiazide (HYZAAR) 100-25 MG per tablet 90 tablet 1     Sig: TAKE (1) TABLET BY MOUTH EVERY DAY **NEW STRENGTH**    meloxicam (MOBIC) 15 MG tablet 90 tablet 1     Sig: Once dly    gabapentin (NEURONTIN) 100 MG capsule 90 capsule 0     Sig: Take 1 capsule by mouth 3 times daily for 30 days.        Last Appointment Date: 3/22/2022  Next Appointment Date: Visit date not found    No Known Allergies

## 2022-04-22 DIAGNOSIS — G60.9 IDIOPATHIC PERIPHERAL NEUROPATHY: ICD-10-CM

## 2022-04-22 RX ORDER — GABAPENTIN 100 MG/1
100 CAPSULE ORAL 3 TIMES DAILY
Qty: 90 CAPSULE | Refills: 0 | Status: SHIPPED | OUTPATIENT
Start: 2022-04-22 | End: 2022-05-24 | Stop reason: SDUPTHER

## 2022-04-22 NOTE — TELEPHONE ENCOUNTER
Margie Poster called requesting a refill of the below medication which has been pended for you:     Requested Prescriptions     Pending Prescriptions Disp Refills    gabapentin (NEURONTIN) 100 MG capsule 90 capsule 0     Sig: Take 1 capsule by mouth 3 times daily for 30 days.        Last Appointment Date: 3/22/2022  Next Appointment Date: 9/29/2022    No Known Allergies

## 2022-05-17 DIAGNOSIS — M26.609 JOINT DISEASE, TEMPOROMANDIBULAR: ICD-10-CM

## 2022-05-17 RX ORDER — MELOXICAM 15 MG/1
TABLET ORAL
Qty: 30 TABLET | Refills: 1 | Status: SHIPPED | OUTPATIENT
Start: 2022-05-17 | End: 2022-05-26

## 2022-05-17 RX ORDER — LOSARTAN POTASSIUM AND HYDROCHLOROTHIAZIDE 25; 100 MG/1; MG/1
TABLET ORAL
Qty: 30 TABLET | Refills: 1 | Status: SHIPPED | OUTPATIENT
Start: 2022-05-17 | End: 2022-07-19

## 2022-05-24 DIAGNOSIS — G60.9 IDIOPATHIC PERIPHERAL NEUROPATHY: ICD-10-CM

## 2022-05-24 RX ORDER — GABAPENTIN 100 MG/1
100 CAPSULE ORAL 3 TIMES DAILY
Qty: 90 CAPSULE | Refills: 0 | Status: SHIPPED | OUTPATIENT
Start: 2022-05-24 | End: 2022-06-29 | Stop reason: SDUPTHER

## 2022-05-24 NOTE — TELEPHONE ENCOUNTER
Nieves Flannery called requesting a refill of the below medication which has been pended for you:     Requested Prescriptions     Pending Prescriptions Disp Refills    gabapentin (NEURONTIN) 100 MG capsule 90 capsule 0     Sig: Take 1 capsule by mouth 3 times daily for 30 days.        Last Appointment Date: 3/22/2022  Next Appointment Date: 9/29/2022    No Known Allergies

## 2022-05-25 DIAGNOSIS — M26.609 JOINT DISEASE, TEMPOROMANDIBULAR: ICD-10-CM

## 2022-05-26 RX ORDER — MELOXICAM 15 MG/1
TABLET ORAL
Qty: 30 TABLET | Refills: 1 | Status: SHIPPED | OUTPATIENT
Start: 2022-05-26 | End: 2022-08-26 | Stop reason: SDUPTHER

## 2022-06-29 DIAGNOSIS — G60.9 IDIOPATHIC PERIPHERAL NEUROPATHY: ICD-10-CM

## 2022-06-29 NOTE — TELEPHONE ENCOUNTER
Arnol Oshea called requesting a refill of the below medication which has been pended for you:     Requested Prescriptions     Pending Prescriptions Disp Refills    naltrexone-buPROPion (CONTRAVE) 8-90 MG per extended release tablet 120 tablet 1     Sig: Take 2 tablets by mouth 2 times daily Start 1 tablet every morning x7 days, then 1 tablet twice daily x7 days, then 2 tabs p.o. every morning and 1 tablet every afternoon x1 week, then 2 tabs twice daily thereafter    gabapentin (NEURONTIN) 100 MG capsule 90 capsule 0     Sig: Take 1 capsule by mouth 3 times daily for 30 days.        Last Appointment Date: 3/22/2022  Next Appointment Date: 9/29/2022    No Known Allergies

## 2022-06-30 RX ORDER — GABAPENTIN 100 MG/1
100 CAPSULE ORAL 3 TIMES DAILY
Qty: 90 CAPSULE | Refills: 0 | Status: SHIPPED | OUTPATIENT
Start: 2022-06-30 | End: 2022-08-24 | Stop reason: SDUPTHER

## 2022-07-19 DIAGNOSIS — I10 ESSENTIAL HYPERTENSION: Primary | ICD-10-CM

## 2022-07-19 RX ORDER — LOSARTAN POTASSIUM AND HYDROCHLOROTHIAZIDE 25; 100 MG/1; MG/1
TABLET ORAL
Qty: 90 TABLET | Refills: 0 | Status: SHIPPED | OUTPATIENT
Start: 2022-07-19

## 2022-07-19 NOTE — TELEPHONE ENCOUNTER
Belia Dumont called to request a refill on her medication.       Last office visit : 3/22/2022   Next office visit : 9/29/2022     Requested Prescriptions     Pending Prescriptions Disp Refills    losartan-hydroCHLOROthiazide (HYZAAR) 100-25 MG per tablet [Pharmacy Med Name: LOSARTAN-HCTZ 100-25 MG TAB] 30 tablet 0     Sig: TAKE 1 TABLET BY MOUTH DAILY            Ja Pierce

## 2022-07-26 DIAGNOSIS — R73.09 ELEVATED GLUCOSE: ICD-10-CM

## 2022-07-26 DIAGNOSIS — E78.2 MIXED HYPERLIPIDEMIA: ICD-10-CM

## 2022-07-26 DIAGNOSIS — R10.31 RIGHT INGUINAL PAIN: ICD-10-CM

## 2022-07-26 DIAGNOSIS — I10 ESSENTIAL HYPERTENSION: ICD-10-CM

## 2022-07-26 LAB — T4 FREE: 1.07 NG/DL (ref 0.93–1.7)

## 2022-08-24 DIAGNOSIS — G60.9 IDIOPATHIC PERIPHERAL NEUROPATHY: ICD-10-CM

## 2022-08-25 RX ORDER — GABAPENTIN 100 MG/1
100 CAPSULE ORAL 3 TIMES DAILY
Qty: 90 CAPSULE | Refills: 0 | Status: SHIPPED | OUTPATIENT
Start: 2022-08-25 | End: 2022-09-29 | Stop reason: SDUPTHER

## 2022-08-26 DIAGNOSIS — M26.609 JOINT DISEASE, TEMPOROMANDIBULAR: ICD-10-CM

## 2022-08-29 RX ORDER — MELOXICAM 15 MG/1
TABLET ORAL
Qty: 30 TABLET | Refills: 1 | Status: SHIPPED | OUTPATIENT
Start: 2022-08-29 | End: 2022-09-29 | Stop reason: ALTCHOICE

## 2022-09-29 ENCOUNTER — OFFICE VISIT (OUTPATIENT)
Dept: INTERNAL MEDICINE | Age: 47
End: 2022-09-29
Payer: COMMERCIAL

## 2022-09-29 VITALS — SYSTOLIC BLOOD PRESSURE: 138 MMHG | DIASTOLIC BLOOD PRESSURE: 76 MMHG

## 2022-09-29 DIAGNOSIS — Z02.89 MEDICATION MANAGEMENT CONTRACT AGREEMENT: ICD-10-CM

## 2022-09-29 DIAGNOSIS — M15.3 OTHER SECONDARY OSTEOARTHRITIS OF MULTIPLE SITES: ICD-10-CM

## 2022-09-29 DIAGNOSIS — Z12.11 SCREENING FOR MALIGNANT NEOPLASM OF COLON: ICD-10-CM

## 2022-09-29 DIAGNOSIS — Z23 FLU VACCINE NEED: Primary | ICD-10-CM

## 2022-09-29 DIAGNOSIS — G60.9 IDIOPATHIC PERIPHERAL NEUROPATHY: ICD-10-CM

## 2022-09-29 DIAGNOSIS — R73.09 ELEVATED GLUCOSE: ICD-10-CM

## 2022-09-29 DIAGNOSIS — Z12.31 ENCOUNTER FOR SCREENING MAMMOGRAM FOR MALIGNANT NEOPLASM OF BREAST: ICD-10-CM

## 2022-09-29 DIAGNOSIS — E78.2 MIXED HYPERLIPIDEMIA: ICD-10-CM

## 2022-09-29 DIAGNOSIS — I10 ESSENTIAL HYPERTENSION: ICD-10-CM

## 2022-09-29 PROCEDURE — 80305 DRUG TEST PRSMV DIR OPT OBS: CPT | Performed by: INTERNAL MEDICINE

## 2022-09-29 PROCEDURE — 90471 IMMUNIZATION ADMIN: CPT | Performed by: INTERNAL MEDICINE

## 2022-09-29 PROCEDURE — 99214 OFFICE O/P EST MOD 30 MIN: CPT | Performed by: INTERNAL MEDICINE

## 2022-09-29 PROCEDURE — 90674 CCIIV4 VAC NO PRSV 0.5 ML IM: CPT | Performed by: INTERNAL MEDICINE

## 2022-09-29 RX ORDER — MONTELUKAST SODIUM 10 MG/1
10 TABLET ORAL DAILY
COMMUNITY
Start: 2022-09-12

## 2022-09-29 RX ORDER — FLUTICASONE FUROATE AND VILANTEROL 100; 25 UG/1; UG/1
1 POWDER RESPIRATORY (INHALATION) DAILY
COMMUNITY

## 2022-09-29 RX ORDER — GABAPENTIN 100 MG/1
100 CAPSULE ORAL 3 TIMES DAILY
Qty: 90 CAPSULE | Refills: 0 | Status: SHIPPED | OUTPATIENT
Start: 2022-09-29 | End: 2022-10-23 | Stop reason: SDUPTHER

## 2022-09-29 RX ORDER — DICLOFENAC SODIUM 75 MG/1
75 TABLET, DELAYED RELEASE ORAL 2 TIMES DAILY
Qty: 180 TABLET | Refills: 3 | Status: SHIPPED | OUTPATIENT
Start: 2022-09-29

## 2022-09-29 SDOH — ECONOMIC STABILITY: FOOD INSECURITY: WITHIN THE PAST 12 MONTHS, THE FOOD YOU BOUGHT JUST DIDN'T LAST AND YOU DIDN'T HAVE MONEY TO GET MORE.: NEVER TRUE

## 2022-09-29 SDOH — ECONOMIC STABILITY: FOOD INSECURITY: WITHIN THE PAST 12 MONTHS, YOU WORRIED THAT YOUR FOOD WOULD RUN OUT BEFORE YOU GOT MONEY TO BUY MORE.: NEVER TRUE

## 2022-09-29 SDOH — ECONOMIC STABILITY: TRANSPORTATION INSECURITY
IN THE PAST 12 MONTHS, HAS LACK OF TRANSPORTATION KEPT YOU FROM MEETINGS, WORK, OR FROM GETTING THINGS NEEDED FOR DAILY LIVING?: NO

## 2022-09-29 SDOH — ECONOMIC STABILITY: TRANSPORTATION INSECURITY
IN THE PAST 12 MONTHS, HAS THE LACK OF TRANSPORTATION KEPT YOU FROM MEDICAL APPOINTMENTS OR FROM GETTING MEDICATIONS?: NO

## 2022-09-29 ASSESSMENT — ENCOUNTER SYMPTOMS
ABDOMINAL PAIN: 0
BACK PAIN: 0
VOMITING: 0
EYE ITCHING: 0
SHORTNESS OF BREATH: 0
ABDOMINAL DISTENTION: 0
SORE THROAT: 0
EYE DISCHARGE: 0
BLOOD IN STOOL: 0
WHEEZING: 0
TROUBLE SWALLOWING: 0
NAUSEA: 0
SINUS PRESSURE: 0

## 2022-09-29 ASSESSMENT — SOCIAL DETERMINANTS OF HEALTH (SDOH): HOW HARD IS IT FOR YOU TO PAY FOR THE VERY BASICS LIKE FOOD, HOUSING, MEDICAL CARE, AND HEATING?: NOT HARD AT ALL

## 2022-09-29 NOTE — LETTER
CONTROLLED SUBSTANCE MEDICATION AGREEMENT     Patient Name: Ike Rendon  Patient YOB: 1975     I understand, that controlled substance medications may be used to help better manage my symptoms and to improve my ability to function at home, work and in social settings. However, I also understand that these medications do have risks, which have been discussed with me, including possible development of physical or psychological dependence. I understand that successful treatment requires mutual trust and honesty between me and my provider. I understand and agree that following this Medication Agreement is necessary in continuing my provider-patient relationship and the success of my treatment plan. Explanation from my Provider: Benefits and Goals of Controlled Substance Medications: There are two potential goals for your treatment: (1) decreased pain and suffering (2) improved daily life functions. There are many possible treatments for your chronic condition(s). Alternatives such as physical therapy, yoga, massage, home daily exercise, meditation, relaxation techniques, injections, chiropractic manipulations, surgery, cognitive therapy, hypnosis and many medications that are not habit-forming may be used. Use of controlled substance medications may be helpful, but they are unlikely to resolve all symptoms or restore all function. Explanation from my Provider: Risks of Controlled Substance Medications:  Opioid pain medications: These medications can lead to problems such as addiction/dependence, sedation, lightheadedness/dizziness, memory issues, falls, constipation, nausea, or vomiting. They may also impair the ability to drive or operate machinery. Additionally, these medications may lower testosterone levels, leading to loss of bone strength, stamina and sex drive.   They may cause problems with breathing, sleep apnea and reduced coughing, which is especially dangerous for patients written agreement among other prescribers of controlled substances outlining the responsibility of the medications being prescribed.  I understand that the if the controlled medication is not helping to achieve goals, the dosage may be tapered and no longer prescribed. 3. MY RESPONSIBILITY FOR COMMUNICATION / PRESCRIPTION RENEWALS   I agree that all controlled substance medications that I take will be prescribed only by my provider. If another healthcare provider prescribes me medication in an emergency, I will notify my provider within seventy-two (72) hours.  I will arrange for refills at the prescribed interval ONLY during regular office hours. I will not ask for refills earlier than agreed, after-hours, on holidays or weekends. Refills may take up to 72 hours for processing and prescriptions to reach the pharmacy.  I will inform my other health care providers that I am taking these medications and of the existence of this [id number]. In the event of an emergency, I will provide the same information to the emergency department prescribers.  I will keep my provider updated on the pharmacy I am using for controlled medication prescription filling. Initials:_______  4. MY RESPONSIBILITY FOR PROTECTING MEDICATIONS   I will protect my prescriptions and medications. I understand that lost or misplaced prescriptions will not be replaced.  I will keep medications only for my own use and will not share them with others. I will keep all medications away from children.  I agree that if my medications are adjusted or discontinued, I will properly dispose of any remaining medications. I understand that I will be required to dispose of any remaining controlled medications as, directed by my prescriber, prior to being provided with any prescriptions for other controlled medications.   Medication drop box locations can be found at: [email or web address]    5. MY RESPONSIBILITY WITH ILLEGAL DRUGS    I will not use illegal or street drugs or another person's prescription medications not prescribed to me.  If there are identified addiction type symptoms, then referral to a program may be provided by my provider and I agree to follow through with this recommendation. 6. MY RESPONSIBILITY FOR COOPERATION WITH INVESTIGATIONS   I understand that my provider will comply with any applicable law and may discuss my use and/or possible misuse/abuse of controlled substances and alcohol, as appropriate, with any health care provider involved in my care, pharmacist, or legal authority.  I authorize my provider and pharmacy to cooperate fully with law enforcement agencies (as permitted by law) in the investigation of any possible misuse, sale, or other diversion of my controlled substances.  I agree to waive any applicable privilege or right of privacy or confidentiality with respect to these authorizations. 7. PROVIDERS RIGHT TO MONITOR FOR SAFETY: PRESCRIPTION MONITORING / DRUG TESTING   I consent to drug/toxicology screening and will submit to a drug screen upon my providers request to assure I am only taking the prescribed drugs for my safety monitoring. I understand that a drug screen is a laboratory test in which a sample of my urine, blood or saliva is checked to see what drugs I have been taking. This may entail an observed urine specimen, which means that a nurse or other health care provider may watch me provide urine, and I will cooperate if I am asked to provide an observed specimen.  I understand that my provider will check a copy of my State Prescription Monitoring Program () Report in order to safely prescribe medications.  Pill Counts: I consent to pill counts when requested.   I may be asked to bring all my prescribed controlled substance medications, in their original bottles, to all of my scheduled appointments. In addition, my provider may ask me to come to the practice at any time for a random pill count. 8. TERMINATION OF THIS AGREEMENT  For my safety, my prescriber has the right to stop prescribing controlled substance medications and may end this agreement. Initials:_______   Conditions that may result in termination of this agreement:  a. I do not show any improvement in pain, or my activity has not improved. b. I develop rapid tolerance or loss of improvement, as described in my treatment plan.  c. I develop significant side effects from the medication. d. My behavior is not consistent with the responsibilities outlined above, thereby causing safety concerns to continue prescribing controlled substance medications. e. I fail to follow the terms of this agreement. f. Other:____________________________       UNDERSTANDING THIS MEDICATION AGREEMENT:    I have read the above and have had all my questions answered. For chronic disease management, I know that my symptoms can be managed with many types of treatments. A chronic medication trial may be part of my treatment, but I must be an active participant in my care. Medication therapy is only one part of my symptom management plan. In some cases, there may be limited scientific evidence to support the chronic use of certain medications to improve symptoms and daily function. Furthermore, in certain circumstances, there may be scientific information that suggests that the use of chronic controlled substances may worsen my symptoms and increase my risk of unintentional death directly related to this medication therapy. I know that if my provider feels my risk from controlled medications is greater than my benefit, I will have my controlled substance medication(s) compassionately lowered or removed altogether.      I further agree to allow this office to contact my HIPAA contact if there are concerns about my safety and use of the controlled medications. I have agreed to use the prescribed controlled substance medications to me as instructed by my provider and as stated in this Medication Agreement. My initial on each page and my signature below shows that I have read each page and I have had the opportunity to ask questions with answers provided by my provider.     Patient Name (Printed): _____________________________________  Patient Signature:  ______________________   Date: _____________    Prescriber Name (Printed): ___________________________________  Prescriber Signature: _____________________  Date: _____________

## 2022-09-29 NOTE — PROGRESS NOTES
After obtaining consent, and per orders of Dr. Rosibel Cummings, injection of flu vaccine given in Left deltoid by Odilon Zelaya. Patient instructed to remain in clinic for 20 minutes afterwards, and to report any adverse reaction to me immediately. Lucia Quijano 47 #[id number] LOT #[id number] EXP 6/30/2023.

## 2022-09-29 NOTE — PROGRESS NOTES
[facility] PHYSICIAN SERVICES  [facility] INTERNAL MEDICINE  [address]  [facility] [place] [address]  Dept: [phone]  Dept Fax: [phone]: [phone]      Visit Date: 9/29/2022    Bc glez 50 y.o. female who presents today for:  Chief Complaint   Patient presents with    Annual Exam    Hip Pain     Patient continues to have (R) hip pain. She had done 3 months of physical therapy, 2 injections, and takes Meloxicam. She says the Meloxicam isn't working. She wants to try something else. Discuss Medications     She said the Contrave didn't really work for her so she would like to try something else for it. HPI:     Presented for her annual exam.  She is 50years old with arthritis of the hips as well as hypertension and elevated sugar. She had lab for review.     Past Medical History:   Diagnosis Date    Allergic rhinitis     Asthma     Chronic pansinusitis 8/1/2017    Essential hypertension 11/17/2017    Gastroesophageal reflux disease without esophagitis 10/16/2018    Herpes zoster without complication 2/5/5746    Idiopathic peripheral neuropathy 5/25/2021    Mixed hyperlipidemia 8/30/2019    Osteoarthritis 10/16/2018    Simple chronic bronchitis ([address] 8/1/2017      Past Surgical History:   Procedure Laterality Date    HYSTERECTOMY (CERVIX STATUS UNKNOWN)      HYSTERECTOMY, TOTAL ABDOMINAL (CERVIX REMOVED)  11/17/2015    TONSILLECTOMY         Family History   Problem Relation Age of Onset    Diabetes Mother     High Blood Pressure Mother     Osteoporosis Mother     Diabetes Father     High Blood Pressure Father     High Cholesterol Father     Kidney Disease Father     Asthma Brother        Social History     Tobacco Use    Smoking status: Never    Smokeless tobacco: Never   Substance Use Topics    Alcohol use: No      Current Outpatient Medications   Medication Sig Dispense Refill    Glycerin-Polysorbate 80 (REFRESH DRY EYE THERAPY OP) by NOT APPLICABLE route montelukast (SINGULAIR) 10 MG tablet 10 mg daily      fluticasone-vilanterol (BREO ELLIPTA) 100-25 MCG/INH AEPB inhaler Inhale 1 puff into the lungs daily 1 puff daily for asthma      gabapentin (NEURONTIN) 100 MG capsule Take 1 capsule by mouth 3 times daily for 30 days. 90 capsule 0    diclofenac (VOLTAREN) 75 MG EC tablet Take 1 tablet by mouth 2 times daily 180 tablet 3    losartan-hydroCHLOROthiazide (HYZAAR) 100-25 MG per tablet TAKE 1 TABLET BY MOUTH DAILY 90 tablet 0    EPIPEN 2-SERGIO 0.3 MG/0.3ML SOAJ injection prn 1 each 5    omeprazole (PRILOSEC) 20 MG delayed release capsule Take 20 mg by mouth daily      fluticasone (FLONASE) 50 MCG/ACT nasal spray 1 spray by Nasal route as needed      levocetirizine (XYZAL) 5 MG tablet Take 5 mg by mouth nightly      PROAIR  (90 Base) MCG/ACT inhaler Inhale 2 puffs into the lungs every 6 hours as needed       No current facility-administered medications for this visit. No Known Allergies      Subjective:     Review of Systems   Constitutional:  Negative for activity change, appetite change, fatigue and fever. HENT:  Negative for congestion, hearing loss, sinus pressure, sore throat and trouble swallowing. Eyes:  Negative for discharge and itching. Respiratory:  Negative for shortness of breath and wheezing. Cardiovascular:  Negative for chest pain, palpitations and leg swelling. Gastrointestinal:  Negative for abdominal distention, abdominal pain, blood in stool, nausea and vomiting. Endocrine: Negative for cold intolerance, heat intolerance and polydipsia. Genitourinary:  Negative for flank pain, frequency, hematuria and urgency. Musculoskeletal:  Positive for arthralgias, gait problem and joint swelling. Negative for back pain. Skin:  Negative for rash and wound. Allergic/Immunologic: Negative for environmental allergies and food allergies. Neurological:  Negative for dizziness, tremors, syncope, weakness, numbness and headaches. Reflexes normal.   Psychiatric:         Behavior: Behavior normal.         Thought Content: Thought content normal.         Judgment: Judgment normal.        Assessment:      Diagnosis Orders   1. Flu vaccine need  Influenza, FLUCELVAX, (age 10 mo+), IM, Preservative Free, 0.5 mL    Comprehensive Metabolic Panel    Hemoglobin A1C      2. Encounter for screening mammogram for malignant neoplasm of breast  TOM DIGITAL SCREEN W OR WO CAD BILATERAL    Comprehensive Metabolic Panel    Hemoglobin A1C      3. Screening for malignant neoplasm of colon  Comprehensive Metabolic Panel    Hemoglobin A1C      4. Medication management contract agreement  POCT Rapid Drug Screen    Comprehensive Metabolic Panel    Hemoglobin A1C      5. Idiopathic peripheral neuropathy  gabapentin (NEURONTIN) 100 MG capsule    Comprehensive Metabolic Panel    Hemoglobin A1C      6. Elevated glucose  Comprehensive Metabolic Panel    Hemoglobin A1C      7. Essential hypertension  Comprehensive Metabolic Panel    Hemoglobin A1C      8. Mixed hyperlipidemia  Comprehensive Metabolic Panel    Hemoglobin A1C      9. Other secondary osteoarthritis of multiple sites  Comprehensive Metabolic Panel    Hemoglobin A1C               Plan:     Glucose which is at goal.  She is doing it with diet and exercise. Her hemoglobin A1c is 6.5 down from 6.6 a year ago. Hypertension. Blood pressures well controlled at 138/76. Catalino/HCTZ 100/25 mg daily we will continue the same dose. Hyperlipidemia. Her LDL is down her total cholesterol is down triglycerides are down. She has been trying to watch her diet. She is not able to exercise much because of the arthritis. Arthritis which is worsening of the hips. We will stop her meloxicam and start her on diclofenac 75 mg twice daily. Return in about 6 months (around 3/29/2023) for LAB [facility]. Patient given educational materials- see patient instructions.   Discussed use, benefit, and side effects of prescribedmedications. All patient questions answered. Pt voiced understanding. Reviewedhealth maintenance. Instructed to continue current medications, diet and exercise. Patient agreed with treatment plan. **This report has been created usingvoice recognition software. It may contain minor errors which are inherent in voicerecognition technology. **    Electronically signed by Norberto Clinton MD on 9/29/2022 at 2:27 PM